# Patient Record
Sex: MALE | Race: WHITE | NOT HISPANIC OR LATINO | Employment: OTHER | URBAN - METROPOLITAN AREA
[De-identification: names, ages, dates, MRNs, and addresses within clinical notes are randomized per-mention and may not be internally consistent; named-entity substitution may affect disease eponyms.]

---

## 2019-10-14 ENCOUNTER — APPOINTMENT (EMERGENCY)
Dept: CT IMAGING | Facility: HOSPITAL | Age: 44
End: 2019-10-14
Payer: COMMERCIAL

## 2019-10-14 ENCOUNTER — HOSPITAL ENCOUNTER (EMERGENCY)
Facility: HOSPITAL | Age: 44
Discharge: HOME | End: 2019-10-14
Attending: EMERGENCY MEDICINE | Admitting: EMERGENCY MEDICINE
Payer: COMMERCIAL

## 2019-10-14 VITALS
OXYGEN SATURATION: 100 % | WEIGHT: 184.75 LBS | TEMPERATURE: 98.1 F | HEIGHT: 69 IN | RESPIRATION RATE: 16 BRPM | BODY MASS INDEX: 27.36 KG/M2 | HEART RATE: 76 BPM | DIASTOLIC BLOOD PRESSURE: 72 MMHG | SYSTOLIC BLOOD PRESSURE: 145 MMHG

## 2019-10-14 DIAGNOSIS — R11.0 NAUSEA: ICD-10-CM

## 2019-10-14 DIAGNOSIS — R51.9 HEADACHE: Primary | ICD-10-CM

## 2019-10-14 PROCEDURE — 99283 EMERGENCY DEPT VISIT LOW MDM: CPT

## 2019-10-14 PROCEDURE — 99284 EMERGENCY DEPT VISIT MOD MDM: CPT | Performed by: EMERGENCY MEDICINE

## 2019-10-14 RX ORDER — ONDANSETRON 4 MG/1
4 TABLET, ORALLY DISINTEGRATING ORAL ONCE
Status: COMPLETED | OUTPATIENT
Start: 2019-10-14 | End: 2019-10-14

## 2019-10-14 RX ORDER — METOCLOPRAMIDE 10 MG/1
10 TABLET ORAL 4 TIMES DAILY
Qty: 28 TABLET | Refills: 0 | Status: SHIPPED | OUTPATIENT
Start: 2019-10-14 | End: 2019-10-21

## 2019-10-14 RX ORDER — ACETAMINOPHEN 325 MG/1
975 TABLET ORAL ONCE
Status: COMPLETED | OUTPATIENT
Start: 2019-10-14 | End: 2019-10-14

## 2019-10-14 RX ADMIN — ACETAMINOPHEN 975 MG: 325 TABLET, FILM COATED ORAL at 01:54

## 2019-10-14 RX ADMIN — ONDANSETRON 4 MG: 4 TABLET, ORALLY DISINTEGRATING ORAL at 01:54

## 2019-10-14 NOTE — ED PROVIDER NOTES
History  Chief Complaint   Patient presents with    Headache     Pt presents to ED from home where pt had a stressful day, now having HA and head pressure  PT stated "I feel like blood is going to come out my ear "  Pt denies hx of migraines  Pt (-) N/V, (-) sensitivity to light or sound  Patient is a 45year old male with gradual diffuse headache since yesterday afternoon  Took ibuprofen with some relief  No trauma  (+) nausea  No vomiting  No fever  No SAH in family  States he did not drive here  (+) increased stress  No recent old records from this ED seen on computer system  [facility] website checked on this patient and no Rx found  No photophobia  States he feels like his ears are going to burst        History provided by:  Patient and spouse   used: No    Headache   Associated symptoms: nausea    Associated symptoms: no fever, no photophobia and no vomiting        None       History reviewed  No pertinent past medical history  History reviewed  No pertinent surgical history  History reviewed  No pertinent family history  I have reviewed and agree with the history as documented  Social History     Tobacco Use    Smoking status: Never Smoker    Smokeless tobacco: Never Used   Substance Use Topics    Alcohol use: Yes     Comment: occ    Drug use: Not Currently        Review of Systems   Constitutional: Negative for fever  Eyes: Negative for photophobia  Gastrointestinal: Positive for nausea  Negative for vomiting  Neurological: Positive for headaches  All other systems reviewed and are negative  Physical Exam  Physical Exam   Constitutional: He is oriented to person, place, and time  He appears well-developed and well-nourished  He appears distressed (mild)  HENT:   Head: Normocephalic and atraumatic  Right Ear: External ear normal    Left Ear: External ear normal    Mouth/Throat: Oropharynx is clear and moist  No oropharyngeal exudate     nontender scalp and sinuses  Eyes: Pupils are equal, round, and reactive to light  EOM are normal  No scleral icterus  Fundi: no hemorrhage  No photosensitivity  Neck: Normal range of motion  Neck supple  No tracheal deviation present  Cardiovascular: Normal rate, regular rhythm and normal heart sounds  No murmur heard  Pulmonary/Chest: Effort normal and breath sounds normal  No respiratory distress  Abdominal: Soft  Bowel sounds are normal  There is no tenderness  Musculoskeletal: He exhibits no edema or deformity  Neurological: He is alert and oriented to person, place, and time  Skin: Skin is warm and dry  No rash noted  Psychiatric:   Somewhat anxious  Nursing note and vitals reviewed  Vital Signs  ED Triage Vitals [10/14/19 0050]   Temperature Pulse Respirations Blood Pressure SpO2   98 1 °F (36 7 °C) 76 16 145/72 100 %      Temp Source Heart Rate Source Patient Position - Orthostatic VS BP Location FiO2 (%)   Oral Monitor Sitting Left arm --      Pain Score       8           Vitals:    10/14/19 0050   BP: 145/72   Pulse: 76   Patient Position - Orthostatic VS: Sitting         Visual Acuity      ED Medications  Medications   acetaminophen (TYLENOL) tablet 975 mg (975 mg Oral Given 10/14/19 0154)   ondansetron (ZOFRAN-ODT) dispersible tablet 4 mg (4 mg Oral Given 10/14/19 0154)       Diagnostic Studies  Results Reviewed     None                 CT head without contrast    (Results Pending)              Procedures  Procedures       ED Course  ED Course as of Oct 14 0202   Mon Oct 14, 2019   0200 Patient does not want CT head and states he feels better  CT tech notified to cancel CT head  MDM  Number of Diagnoses or Management Options  Diagnosis management comments: DDx including but not limited to: tension headache, cluster headache, migraine; doubt ICH, SAH, tumor, meningitis, temporal arteritis, carbon monoxide poisoning, zoster; sinusitis          Amount and/or Complexity of Data Reviewed  Tests in the radiology section of CPT®: ordered and reviewed  Decide to obtain previous medical records or to obtain history from someone other than the patient: yes        Disposition  Final diagnoses:   Headache   Nausea     Time reflects when diagnosis was documented in both MDM as applicable and the Disposition within this note     Time User Action Codes Description Comment    10/14/2019  1:42 AM Mukul Montana Add [R51] Headache     10/14/2019  1:42 AM [facility] Add [R11 0] Nausea       ED Disposition     ED Disposition Condition Date/Time Comment    Discharge Stable Mon Oct 14, 2019  2:01 AM Arnaldo Castro discharge to home/self care  Follow-up Information     Follow up With Specialties Details Why Contact Info    own primary doctor  Call in 1 day Return sooner if increased pain, worsening nausea, vomiting, rash, lethargy, weakness, numbness  tylenol for pain  Patient's Medications   Discharge Prescriptions    METOCLOPRAMIDE (REGLAN) 10 MG TABLET    Take 1 tablet (10 mg total) by mouth 4 (four) times a day for 7 days As needed for nausea       Start Date: 10/14/2019End Date: 10/21/2019       Order Dose: 10 mg       Quantity: 28 tablet    Refills: 0     No discharge procedures on file      ED Provider  Electronically Signed by           Dinah Ramsey MD  10/14/19 0745

## 2019-10-14 NOTE — ED NOTES
Patient states he does not want to stay for CT Head, just wants to go home  Dr Saad Taylor made aware       Ketan Hardy, RN  10/14/19 7601

## 2020-03-06 ENCOUNTER — OFFICE VISIT (OUTPATIENT)
Dept: INTERNAL MEDICINE CLINIC | Facility: CLINIC | Age: 45
End: 2020-03-06
Payer: COMMERCIAL

## 2020-03-06 VITALS
DIASTOLIC BLOOD PRESSURE: 76 MMHG | SYSTOLIC BLOOD PRESSURE: 122 MMHG | BODY MASS INDEX: 28.95 KG/M2 | RESPIRATION RATE: 15 BRPM | HEART RATE: 80 BPM | OXYGEN SATURATION: 98 % | WEIGHT: 191 LBS | HEIGHT: 68 IN

## 2020-03-06 DIAGNOSIS — Z11.4 SCREENING FOR HIV WITHOUT PRESENCE OF RISK FACTORS: ICD-10-CM

## 2020-03-06 DIAGNOSIS — G25.81 RESTLESS LEG: ICD-10-CM

## 2020-03-06 DIAGNOSIS — M25.561 CHRONIC PAIN OF BOTH KNEES: Primary | ICD-10-CM

## 2020-03-06 DIAGNOSIS — B07.0 PLANTAR WART OF BOTH FEET: ICD-10-CM

## 2020-03-06 DIAGNOSIS — N20.0 NEPHROLITHIASIS: ICD-10-CM

## 2020-03-06 DIAGNOSIS — M25.562 CHRONIC PAIN OF BOTH KNEES: Primary | ICD-10-CM

## 2020-03-06 DIAGNOSIS — Z12.5 SCREENING FOR PROSTATE CANCER: ICD-10-CM

## 2020-03-06 DIAGNOSIS — Z13.220 SCREENING FOR HYPERLIPIDEMIA: ICD-10-CM

## 2020-03-06 DIAGNOSIS — Z23 INFLUENZA VACCINE NEEDED: ICD-10-CM

## 2020-03-06 DIAGNOSIS — Z13.29 SCREENING FOR THYROID DISORDER: ICD-10-CM

## 2020-03-06 DIAGNOSIS — N50.812 TESTICULAR PAIN, LEFT: ICD-10-CM

## 2020-03-06 DIAGNOSIS — Z13.1 SCREENING FOR DIABETES MELLITUS: ICD-10-CM

## 2020-03-06 DIAGNOSIS — G89.29 CHRONIC PAIN OF BOTH KNEES: Primary | ICD-10-CM

## 2020-03-06 LAB
SL AMB  POCT GLUCOSE, UA: NEGATIVE
SL AMB LEUKOCYTE ESTERASE,UA: NEGATIVE
SL AMB POCT BILIRUBIN,UA: NEGATIVE
SL AMB POCT BLOOD,UA: NEGATIVE
SL AMB POCT CLARITY,UA: ABNORMAL
SL AMB POCT COLOR,UA: ABNORMAL
SL AMB POCT KETONES,UA: ABNORMAL
SL AMB POCT NITRITE,UA: NEGATIVE
SL AMB POCT PH,UA: 6
SL AMB POCT SPECIFIC GRAVITY,UA: 1.03
SL AMB POCT URINE PROTEIN: 15
SL AMB POCT UROBILINOGEN: 0.2

## 2020-03-06 PROCEDURE — 3008F BODY MASS INDEX DOCD: CPT | Performed by: INTERNAL MEDICINE

## 2020-03-06 PROCEDURE — 90471 IMMUNIZATION ADMIN: CPT

## 2020-03-06 PROCEDURE — 81002 URINALYSIS NONAUTO W/O SCOPE: CPT | Performed by: INTERNAL MEDICINE

## 2020-03-06 PROCEDURE — 99204 OFFICE O/P NEW MOD 45 MIN: CPT | Performed by: INTERNAL MEDICINE

## 2020-03-06 PROCEDURE — 1036F TOBACCO NON-USER: CPT | Performed by: INTERNAL MEDICINE

## 2020-03-06 PROCEDURE — 90686 IIV4 VACC NO PRSV 0.5 ML IM: CPT

## 2020-03-06 RX ORDER — AMOXICILLIN AND CLAVULANATE POTASSIUM 500; 125 MG/1; MG/1
TABLET, FILM COATED ORAL
COMMUNITY
Start: 2019-12-26 | End: 2020-03-06

## 2020-03-06 RX ORDER — IBUPROFEN 600 MG/1
TABLET ORAL
COMMUNITY
Start: 2019-12-26 | End: 2020-03-06

## 2020-03-06 RX ORDER — MELOXICAM 15 MG/1
15 TABLET ORAL DAILY
COMMUNITY
Start: 2018-06-04 | End: 2020-03-06

## 2020-03-06 RX ORDER — MELOXICAM 15 MG/1
15 TABLET ORAL DAILY
Qty: 30 TABLET | Refills: 1 | Status: SHIPPED | OUTPATIENT
Start: 2020-03-06 | End: 2020-09-10 | Stop reason: SDUPTHER

## 2020-03-06 RX ORDER — ESOMEPRAZOLE MAGNESIUM 40 MG/1
40 CAPSULE, DELAYED RELEASE ORAL
COMMUNITY
Start: 2018-06-04 | End: 2020-03-06

## 2020-03-06 RX ORDER — PRAMIPEXOLE DIHYDROCHLORIDE 0.12 MG/1
0.12 TABLET ORAL
Qty: 30 TABLET | Refills: 1 | Status: SHIPPED | OUTPATIENT
Start: 2020-03-06 | End: 2020-09-10 | Stop reason: SDUPTHER

## 2020-03-06 RX ORDER — CHLORHEXIDINE GLUCONATE 0.12 MG/ML
RINSE ORAL
COMMUNITY
Start: 2019-12-26 | End: 2020-03-06
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
## 2020-03-10 ENCOUNTER — APPOINTMENT (OUTPATIENT)
Dept: RADIOLOGY | Age: 45
End: 2020-03-10
Payer: COMMERCIAL

## 2020-03-10 DIAGNOSIS — M25.561 CHRONIC PAIN OF BOTH KNEES: ICD-10-CM

## 2020-03-10 DIAGNOSIS — G89.29 CHRONIC PAIN OF BOTH KNEES: ICD-10-CM

## 2020-03-10 DIAGNOSIS — M25.562 CHRONIC PAIN OF BOTH KNEES: ICD-10-CM

## 2020-03-10 PROCEDURE — 73562 X-RAY EXAM OF KNEE 3: CPT

## 2020-03-12 ENCOUNTER — HOSPITAL ENCOUNTER (OUTPATIENT)
Dept: RADIOLOGY | Age: 45
Discharge: HOME | End: 2020-03-12
Payer: COMMERCIAL

## 2020-03-12 DIAGNOSIS — N50.812 TESTICULAR PAIN, LEFT: ICD-10-CM

## 2020-03-12 DIAGNOSIS — N20.0 NEPHROLITHIASIS: ICD-10-CM

## 2020-03-12 PROCEDURE — 76870 US EXAM SCROTUM: CPT

## 2020-03-12 PROCEDURE — 76770 US EXAM ABDO BACK WALL COMP: CPT

## 2020-09-10 ENCOUNTER — OFFICE VISIT (OUTPATIENT)
Dept: INTERNAL MEDICINE CLINIC | Facility: CLINIC | Age: 45
End: 2020-09-10
Payer: COMMERCIAL

## 2020-09-10 VITALS
BODY MASS INDEX: 30.16 KG/M2 | HEART RATE: 70 BPM | OXYGEN SATURATION: 99 % | HEIGHT: 68 IN | RESPIRATION RATE: 17 BRPM | DIASTOLIC BLOOD PRESSURE: 80 MMHG | SYSTOLIC BLOOD PRESSURE: 122 MMHG | WEIGHT: 199 LBS

## 2020-09-10 DIAGNOSIS — G25.81 RESTLESS LEG: ICD-10-CM

## 2020-09-10 DIAGNOSIS — M25.562 CHRONIC PAIN OF BOTH KNEES: ICD-10-CM

## 2020-09-10 DIAGNOSIS — M25.561 CHRONIC PAIN OF BOTH KNEES: ICD-10-CM

## 2020-09-10 DIAGNOSIS — N50.812 TESTICULAR PAIN, LEFT: Primary | ICD-10-CM

## 2020-09-10 DIAGNOSIS — G89.29 CHRONIC PAIN OF BOTH KNEES: ICD-10-CM

## 2020-09-10 PROCEDURE — 3725F SCREEN DEPRESSION PERFORMED: CPT | Performed by: INTERNAL MEDICINE

## 2020-09-10 PROCEDURE — 1036F TOBACCO NON-USER: CPT | Performed by: INTERNAL MEDICINE

## 2020-09-10 PROCEDURE — 99396 PREV VISIT EST AGE 40-64: CPT | Performed by: INTERNAL MEDICINE

## 2020-09-10 RX ORDER — PRAMIPEXOLE DIHYDROCHLORIDE 0.12 MG/1
0.12 TABLET ORAL
Qty: 90 TABLET | Refills: 3 | Status: SHIPPED | OUTPATIENT
Start: 2020-09-10 | End: 2021-05-03

## 2020-09-10 RX ORDER — MELOXICAM 15 MG/1
15 TABLET ORAL DAILY PRN
Qty: 90 TABLET | Refills: 1 | Status: SHIPPED | OUTPATIENT
Start: 2020-09-10 | End: 2021-05-03

## 2020-09-10 NOTE — PROGRESS NOTES
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    #Testicular Pain  -continues to persist  -US testicle unremarkable  -refer to urology    #Restless Legs  -noted in lower legs  -remains on pramipexole with relief    #Kidney Stone  -chronic and occasional flares over left flank  -US kidney unremarkable  -to consider CT renal if symptoms persist, patient defers for now  -self treated with beer, cranberry juice    #GERD  -previously on PPI    #Health Maintenance  -routine labs and followup 1 year  -encouraged flu vaccine fall 2020    Addendum 11/19/20 patient seen by urology and diagnosed with orchalgia, will trial gabapentin and NSAID and consider trial spermatic cord block      No problem-specific Assessment & Plan notes found for this encounter  Diagnoses and all orders for this visit:    Testicular pain, left  -     Ambulatory referral to Urology; Future    Chronic pain of both knees  -     meloxicam (MOBIC) 15 mg tablet; Take 1 tablet (15 mg total) by mouth daily as needed for moderate pain    Restless leg  -     pramipexole (MIRAPEX) 0 125 mg tablet; Take 1 tablet (0 125 mg total) by mouth daily at bedtime    Other orders  -     Cancel: influenza vaccine, quadrivalent, 0 5 mL, preservative-free, for adult and pediatric patients 6 mos+ (AFLURIA, FLUARIX, FLULAVAL, FLUZONE)            Current Outpatient Medications:     meloxicam (MOBIC) 15 mg tablet, Take 1 tablet (15 mg total) by mouth daily as needed for moderate pain, Disp: 90 tablet, Rfl: 1    pramipexole (MIRAPEX) 0 125 mg tablet, Take 1 tablet (0 125 mg total) by mouth daily at bedtime, Disp: 90 tablet, Rfl: 3    Subjective:      Patient ID: Candi Ames is a 39 y o  male  HPI     Patient presents for routine visit  Denies any recent hospitalizations or surgeries  States that he continues to occasional testicular pain    We performed an ultrasound his testicles however did not find any abnormalities  We will refer him to urology at this time for further evaluation and treatment  Patient continues to have knee pain  He states that he has been denied within the last ago  Mother is aches resumed his knees were unremarkable  He also has restless legs that have been improved pramipexole and we will refill  Patient previously had a kidney stone  He states that he may have a repeat episode of this  He states that he has been drinking here as well as cranberry juice and within 2-3 days his stone symptoms have resolved  He has deferred CT scan this time  Patient will receive his flu vaccine next month  Will return to care in 1 year with labs  We are awaiting his routine labs when encouraged him to obtain this  The following portions of the patient's history were reviewed and updated as appropriate: allergies, current medications, past family history, past medical history, past social history, past surgical history and problem list     Review of Systems   Constitutional: Negative for activity change, appetite change, fatigue and fever  HENT: Negative for congestion, ear pain, hearing loss, sore throat and tinnitus  Eyes: Negative for photophobia, pain and visual disturbance  Respiratory: Negative for cough, shortness of breath and wheezing  Cardiovascular: Negative for chest pain and leg swelling  Gastrointestinal: Negative for abdominal distention, abdominal pain, nausea and vomiting  Genitourinary: Positive for testicular pain  Negative for difficulty urinating, frequency and hematuria  Musculoskeletal: Positive for arthralgias (knees)  Negative for back pain, joint swelling, neck pain and neck stiffness  Skin: Negative for color change, pallor, rash and wound  Neurological: Negative for dizziness, tremors, numbness and headaches  Hematological: Does not bruise/bleed easily           Objective:      BP 122/80   Pulse 70   Resp 17   Ht 5' 8" (1 727 m)   Wt 90 3 kg (199 lb)   SpO2 99%   BMI 30 26 kg/m²          Physical Exam  Vitals signs reviewed  Constitutional:       Appearance: He is well-developed  HENT:      Head: Normocephalic and atraumatic  Right Ear: External ear normal       Left Ear: External ear normal       Nose: Nose normal    Eyes:      Conjunctiva/sclera: Conjunctivae normal       Pupils: Pupils are equal, round, and reactive to light  Neck:      Musculoskeletal: Normal range of motion and neck supple  Thyroid: No thyromegaly  Vascular: No JVD  Cardiovascular:      Rate and Rhythm: Normal rate and regular rhythm  Heart sounds: Normal heart sounds  No murmur  Pulmonary:      Effort: Pulmonary effort is normal  No respiratory distress  Breath sounds: Normal breath sounds  No wheezing  Abdominal:      General: Bowel sounds are normal  There is no distension  Palpations: Abdomen is soft  Tenderness: There is no abdominal tenderness  There is no guarding or rebound  Musculoskeletal: Normal range of motion  General: Tenderness (anterior knees) present  No deformity  Right lower leg: No edema  Left lower leg: No edema  Lymphadenopathy:      Cervical: No cervical adenopathy  Skin:     General: Skin is warm and dry  Findings: No erythema or rash  Neurological:      Mental Status: He is alert and oriented to person, place, and time  Deep Tendon Reflexes: Reflexes are normal and symmetric

## 2020-10-20 ENCOUNTER — TELEPHONE (OUTPATIENT)
Dept: UROLOGY | Facility: CLINIC | Age: 45
End: 2020-10-20

## 2020-11-13 ENCOUNTER — OFFICE VISIT (OUTPATIENT)
Dept: UROLOGY | Facility: CLINIC | Age: 45
End: 2020-11-13
Payer: COMMERCIAL

## 2020-11-13 VITALS
WEIGHT: 185 LBS | DIASTOLIC BLOOD PRESSURE: 86 MMHG | SYSTOLIC BLOOD PRESSURE: 128 MMHG | TEMPERATURE: 97.3 F | HEART RATE: 70 BPM | BODY MASS INDEX: 28.13 KG/M2

## 2020-11-13 DIAGNOSIS — N50.812 PAIN IN LEFT TESTICLE: Primary | ICD-10-CM

## 2020-11-13 PROCEDURE — 1036F TOBACCO NON-USER: CPT | Performed by: PHYSICIAN ASSISTANT

## 2020-11-13 PROCEDURE — 99244 OFF/OP CNSLTJ NEW/EST MOD 40: CPT | Performed by: PHYSICIAN ASSISTANT

## 2020-11-13 RX ORDER — GABAPENTIN 100 MG/1
100 CAPSULE ORAL
Qty: 60 CAPSULE | Refills: 0 | Status: SHIPPED | OUTPATIENT
Start: 2020-11-13 | End: 2020-11-13

## 2020-11-13 RX ORDER — GABAPENTIN 300 MG/1
300 CAPSULE ORAL
Qty: 60 CAPSULE | Refills: 0 | Status: SHIPPED | OUTPATIENT
Start: 2020-11-13 | End: 2021-05-03

## 2021-01-11 ENCOUNTER — EVALUATION (OUTPATIENT)
Dept: PHYSICAL THERAPY | Facility: REHABILITATION | Age: 46
End: 2021-01-11
Payer: COMMERCIAL

## 2021-01-11 DIAGNOSIS — N50.812 PAIN IN LEFT TESTICLE: Primary | ICD-10-CM

## 2021-01-11 PROCEDURE — 97162 PT EVAL MOD COMPLEX 30 MIN: CPT | Performed by: PHYSICAL THERAPIST

## 2021-01-11 PROCEDURE — 97110 THERAPEUTIC EXERCISES: CPT | Performed by: PHYSICAL THERAPIST
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
## 2021-01-19 ENCOUNTER — OFFICE VISIT (OUTPATIENT)
Dept: UROLOGY | Facility: CLINIC | Age: 46
End: 2021-01-19
Payer: COMMERCIAL

## 2021-01-19 ENCOUNTER — TELEPHONE (OUTPATIENT)
Dept: UROLOGY | Facility: CLINIC | Age: 46
End: 2021-01-19

## 2021-01-19 VITALS
BODY MASS INDEX: 28.04 KG/M2 | HEIGHT: 68 IN | SYSTOLIC BLOOD PRESSURE: 120 MMHG | DIASTOLIC BLOOD PRESSURE: 64 MMHG | HEART RATE: 68 BPM | WEIGHT: 185 LBS

## 2021-01-19 DIAGNOSIS — N50.812 PAIN IN LEFT TESTICLE: Primary | ICD-10-CM

## 2021-01-19 PROCEDURE — 99213 OFFICE O/P EST LOW 20 MIN: CPT | Performed by: NURSE PRACTITIONER

## 2021-01-19 PROCEDURE — 3008F BODY MASS INDEX DOCD: CPT | Performed by: NURSE PRACTITIONER

## 2021-01-19 PROCEDURE — 1036F TOBACCO NON-USER: CPT | Performed by: NURSE PRACTITIONER

## 2021-01-19 NOTE — TELEPHONE ENCOUNTER
Patient seen today by Shira Hudson for orchalgia  She would like patient to follow up with Dr Kala Colindres at [facility] fpr ongoing orchalgia management  Please advise

## 2021-01-19 NOTE — TELEPHONE ENCOUNTER
I spoke to pt and he asked to be rescheduled to May   He stated he is doing therapy and he would like to see if that helps first

## 2021-01-19 NOTE — TELEPHONE ENCOUNTER
Patient scheduled for 2/22/21 at 815am with Dr Rico Tolentino in the [facility] office  Please confirm

## 2021-02-17 ENCOUNTER — OFFICE VISIT (OUTPATIENT)
Dept: PHYSICAL THERAPY | Facility: REHABILITATION | Age: 46
End: 2021-02-17
Payer: COMMERCIAL

## 2021-02-17 DIAGNOSIS — N50.812 PAIN IN LEFT TESTICLE: Primary | ICD-10-CM

## 2021-02-17 PROCEDURE — 97110 THERAPEUTIC EXERCISES: CPT | Performed by: PHYSICAL THERAPIST

## 2021-02-17 PROCEDURE — 97140 MANUAL THERAPY 1/> REGIONS: CPT | Performed by: PHYSICAL THERAPIST

## 2021-02-17 NOTE — PROGRESS NOTES
Daily Note     Today's date: 2021  Patient name: He Orozco  : 1975  MRN: [id number]  Referring provider: Jocelyne Campos  Dx:   Encounter Diagnosis     ICD-10-CM    1  Pain in left testicle  N50 812                   Subjective: Pt has been feeling the same since IE  Pain is only on the left  Pt denies saddle anesthesia  Pt reports no increased pain after IE  Objective: See treatment diary below      Assessment: Tolerated treatment well  Patient demonstrated fatigue post treatment, exhibited good technique with therapeutic exercises and would benefit from continued PT to decrease pain in L testicle  Pt had good tolerance to stretches with L LE noticeably tighter than the R  Pt has significant soft tissue restrictions in L adductor, mild to moderate on R side  Pt has trigger points without pain throughout L abdominals  Applied cupping with good tolerance from pt but only mild change in tissue restriction  Pt was given updated HEP and verbalized understanding  Plan: Continue per plan of care  Progress treatment as tolerated         Precautions: GERD      Manuals            Adductor STM nv ED           Lower abdominal skin rolling nv ED           L abdominals cupping  ED                        Neuro Re-Ed            Diaphragmatic breathing nv nv                                                                                         Ther Ex            Piriformis stretch 2x30" ea 3x30" ea           Adductor LLLD stretch nv 3 min           Open book stretch nv 10x ea           LTR nv 3"x10 ea           Hip flexor stretch EOT nv 30"x3 ea           APT/PPT nv 10x                                     Ther Activity                                       Gait Training                                       Modalities

## 2021-02-18 ENCOUNTER — TELEPHONE (OUTPATIENT)
Dept: INTERNAL MEDICINE CLINIC | Facility: CLINIC | Age: 46
End: 2021-02-18

## 2021-02-18 NOTE — TELEPHONE ENCOUNTER
Patient reports that he has been experiencing a sore throat with globus sensation for the past month  States that on occasion it is difficult to drink and swallow food  Denies nasal congestion, fever, cough, sinus pressure  Reports he does not feel any lumps around neck area  DENies issues breathing  Recommended that he come into the office for an evaluation 2/19/21 at 10:30am due to need for physical exam     Also reports he has acid reflux and dark stool for past week  Reports he ate spicy food  Denies FRITZ, fatigue, chest pain  Recommended he avoid acidic foods  States he has been taking a protein meal replacement supplement and he will bring it into the office tomorrow  Recommended famotidine for now

## 2021-02-19 ENCOUNTER — OFFICE VISIT (OUTPATIENT)
Dept: INTERNAL MEDICINE CLINIC | Facility: CLINIC | Age: 46
End: 2021-02-19
Payer: COMMERCIAL

## 2021-02-19 VITALS
WEIGHT: 195 LBS | DIASTOLIC BLOOD PRESSURE: 60 MMHG | SYSTOLIC BLOOD PRESSURE: 124 MMHG | BODY MASS INDEX: 29.65 KG/M2 | HEART RATE: 80 BPM

## 2021-02-19 DIAGNOSIS — K92.1 BLOODY STOOL: ICD-10-CM

## 2021-02-19 DIAGNOSIS — Z12.5 SCREENING FOR PROSTATE CANCER: ICD-10-CM

## 2021-02-19 DIAGNOSIS — Z13.29 SCREENING FOR THYROID DISORDER: ICD-10-CM

## 2021-02-19 DIAGNOSIS — R74.01 TRANSAMINITIS: ICD-10-CM

## 2021-02-19 DIAGNOSIS — Z13.220 SCREENING FOR HYPERLIPIDEMIA: ICD-10-CM

## 2021-02-19 DIAGNOSIS — K21.9 GASTROESOPHAGEAL REFLUX DISEASE, UNSPECIFIED WHETHER ESOPHAGITIS PRESENT: Primary | ICD-10-CM

## 2021-02-19 DIAGNOSIS — Z13.1 SCREENING FOR DIABETES MELLITUS: ICD-10-CM

## 2021-02-19 PROCEDURE — 99213 OFFICE O/P EST LOW 20 MIN: CPT | Performed by: INTERNAL MEDICINE

## 2021-02-19 PROCEDURE — 1036F TOBACCO NON-USER: CPT | Performed by: INTERNAL MEDICINE

## 2021-02-19 RX ORDER — FAMOTIDINE 40 MG/1
40 TABLET, FILM COATED ORAL 2 TIMES DAILY PRN
Qty: 60 TABLET | Refills: 0 | Status: SHIPPED | OUTPATIENT
Start: 2021-02-19 | End: 2021-09-17

## 2021-02-19 NOTE — PROGRESS NOTES
Assessment/Plan:    #GERD  -episode present for past month with burning sensation in throat area with difficulty eating and swallowing on occasion  -previous 2018 fluoroscopy upper GI with air and KUB negative except for hiatal hernia  -went over low acidic diet  -start pepcid  -refer to GI    #Dysphagia  -reports difficulty with eating and drinking with sensation of food getting stuck on ccasion    #Bloody Stools  -reports eating spicy food  -has intermittent spotty bleeding  -denies abdominal pain  -will refer to GI    BMI Counseling: Body mass index is 29 65 kg/m²  Discussed the patient's BMI with him  The BMI is above normal  Nutrition recommendations include 3-5 servings of fruits/vegetables daily, moderation in carbohydrate intake and reducing intake of saturated fat and trans fat  Exercise recommendations include vigorous aerobic physical activity for 75 minutes/week and exercising 3-5 times per week  Addendum 3/3/21 reports pepcid is working  Addendum 4/9/21 EGD unremarkable    No problem-specific Assessment & Plan notes found for this encounter  Diagnoses and all orders for this visit:    Gastroesophageal reflux disease, unspecified whether esophagitis present  -     famotidine (PEPCID) 40 MG tablet; Take 1 tablet (40 mg total) by mouth 2 (two) times a day as needed for indigestion  -     Ambulatory referral to Gastroenterology; Future  -     CBC and differential  -     Comprehensive metabolic panel    Bloody stool  -     famotidine (PEPCID) 40 MG tablet; Take 1 tablet (40 mg total) by mouth 2 (two) times a day as needed for indigestion  -     Ambulatory referral to Gastroenterology;  Future  -     CBC and differential  -     Comprehensive metabolic panel    Screening for diabetes mellitus  -     CBC and differential  -     Hemoglobin A1C  -     Comprehensive metabolic panel    Screening for hyperlipidemia  -     CBC and differential  -     Comprehensive metabolic panel  -     Lipid Panel With Direct LDL    Screening for thyroid disorder  -     CBC and differential  -     TSH, 3rd generation with Free T4 reflex  -     Comprehensive metabolic panel    Screening for prostate cancer  -     CBC and differential  -     Comprehensive metabolic panel  -     PSA, total and free            Current Outpatient Medications:     famotidine (PEPCID) 40 MG tablet, Take 1 tablet (40 mg total) by mouth 2 (two) times a day as needed for indigestion, Disp: 60 tablet, Rfl: 0    gabapentin (NEURONTIN) 300 mg capsule, Take 1 capsule (300 mg total) by mouth daily at bedtime (Patient not taking: Reported on 1/19/2021), Disp: 60 capsule, Rfl: 0    meloxicam (MOBIC) 15 mg tablet, Take 1 tablet (15 mg total) by mouth daily as needed for moderate pain (Patient not taking: Reported on 11/13/2020), Disp: 90 tablet, Rfl: 1    pramipexole (MIRAPEX) 0 125 mg tablet, Take 1 tablet (0 125 mg total) by mouth daily at bedtime (Patient not taking: Reported on 11/13/2020), Disp: 90 tablet, Rfl: 3    Subjective:      Patient ID: Arnaldo Castro is a 39 y o  male  HPI     Patient presents for an acute visit  Reports that he has been experiencing a sore throat with feeling as if food is getting stuck in his throat area  He denies any nasal congestion or any fevers or cough or any sinus pressure  There are no lumps palpable on examination  Thyroid felt normal size  Did not have any issues breathing  He reports that symptoms have fell on and off since middle December  He does have acid reflux with a burning sensation  He has had been having blood in his stool for the past week  He states that he ate a significant amount of spicy food however he has discontinued this  We discussed a low acidic diet with him today  In 2018 patient underwent fluoroscopy with an upper GI series which revealed a hiatal hernia but no other signs of any masses    At this time we recommended the patient start on Pepcid to see if this will provide him with any relief  We will refer him to GI for further evaluation  The following portions of the patient's history were reviewed and updated as appropriate: allergies, current medications, past family history, past medical history, past social history, past surgical history and problem list     Review of Systems   Constitutional: Negative for activity change, appetite change, fatigue and fever  HENT: Positive for trouble swallowing  Negative for congestion, ear pain, hearing loss, sore throat and tinnitus  Eyes: Negative for photophobia, pain and visual disturbance  Respiratory: Negative for cough, shortness of breath and wheezing  Cardiovascular: Negative for chest pain and leg swelling  Gastrointestinal: Positive for abdominal pain, blood in stool and diarrhea  Negative for abdominal distention, constipation, nausea and vomiting  Genitourinary: Negative for difficulty urinating, frequency and hematuria  Musculoskeletal: Negative for arthralgias, back pain, joint swelling, neck pain and neck stiffness  Skin: Negative for color change, pallor, rash and wound  Neurological: Negative for dizziness, tremors, numbness and headaches  Hematological: Does not bruise/bleed easily  Objective:      /60   Pulse 80   Wt 88 5 kg (195 lb)   BMI 29 65 kg/m²          Physical Exam  Vitals signs reviewed  Constitutional:       Appearance: Normal appearance  He is well-developed  HENT:      Head: Normocephalic and atraumatic  Nose: Nose normal  No congestion or rhinorrhea  Mouth/Throat:      Mouth: Mucous membranes are dry  Pharynx: Oropharynx is clear  No oropharyngeal exudate or posterior oropharyngeal erythema  Eyes:      Conjunctiva/sclera: Conjunctivae normal       Pupils: Pupils are equal, round, and reactive to light  Neck:      Musculoskeletal: Normal range of motion and neck supple  Thyroid: No thyromegaly  Vascular: No JVD     Cardiovascular:      Rate and Rhythm: Normal rate and regular rhythm  Pulses: Normal pulses  Heart sounds: Normal heart sounds  No murmur  Pulmonary:      Effort: Pulmonary effort is normal  No respiratory distress  Breath sounds: Normal breath sounds  No stridor  No wheezing, rhonchi or rales  Abdominal:      General: Bowel sounds are normal  There is no distension  Palpations: Abdomen is soft  There is no mass  Tenderness: There is no abdominal tenderness  There is no right CVA tenderness, left CVA tenderness, guarding or rebound  Musculoskeletal: Normal range of motion  General: No tenderness or deformity  Right lower leg: No edema  Left lower leg: No edema  Lymphadenopathy:      Cervical: No cervical adenopathy  Skin:     General: Skin is warm and dry  Findings: No erythema or rash  Neurological:      Mental Status: He is alert and oriented to person, place, and time  Deep Tendon Reflexes: Reflexes are normal and symmetric  This note was completed in part utilizing [employer] direct voice recognition software  Grammatical errors, random word insertion, spelling mistakes, and incomplete sentences may be an occasional consequence of the system secondary to software limitations, ambient noise and hardware issues  At the time of dictation, efforts were made to edit, clarify and /or correct errors  Please read the chart carefully and recognize, using context, where substitutions have occurred  If you have any questions or concerns about the context, text or information contained within the body of this dictation, please contact myself, the provider, for further clarification

## 2021-02-26 LAB — HBA1C MFR BLD HPLC: 5.3 %

## 2021-03-03 ENCOUNTER — OFFICE VISIT (OUTPATIENT)
Dept: PHYSICAL THERAPY | Facility: REHABILITATION | Age: 46
End: 2021-03-03
Payer: COMMERCIAL

## 2021-03-03 DIAGNOSIS — N50.812 PAIN IN LEFT TESTICLE: Primary | ICD-10-CM

## 2021-03-03 PROCEDURE — 97110 THERAPEUTIC EXERCISES: CPT | Performed by: PHYSICAL THERAPIST

## 2021-03-03 PROCEDURE — 97140 MANUAL THERAPY 1/> REGIONS: CPT | Performed by: PHYSICAL THERAPIST

## 2021-03-03 NOTE — PROGRESS NOTES
Daily Note     Today's date: 3/3/2021  Patient name: Joann Vallecillo  : 1975  MRN: [id number]  Referring provider: Ely Grigsby  Dx:   Encounter Diagnosis     ICD-10-CM    1  Pain in left testicle  N50 812                   Subjective: Pt has been feeling good  "I didn't do as much exercise as I should " Pt states that testicular pain is about the same as before  Pt continues to have pain worse with arousal and after 2-3 days of abstinence  Pt will start on exercise bike on Monday  Objective: See treatment diary below      Assessment: Tolerated treatment well  Patient demonstrated fatigue post treatment, exhibited good technique with therapeutic exercises and would benefit from continued PT to decrease testicular pain  Pt continues to have less flexibility on L compared to R  Pt has significant soft tissue restrictions in L abdomen, almost feeling like a rope or cord  Pt has mild improvement with cupping to L abdomen  Pt has good tolerance to stretches and will continue these at home  Added back extension exercises to stretch abdomen without increase in pain  Plan: Continue per plan of care  Progress treatment as tolerated         Precautions: GERD      Manuals  3/3          Adductor STM nv ED L, ED          Lower abdominal skin rolling nv ED ED          L abdominals cupping  ED ED                       Neuro Re-Ed  3/3          Diaphragmatic breathing nv nv nv                                                                                        Ther Ex  3/3          Piriformis stretch 2x30" ea 3x30" ea 30"x3 ea          Adductor LLLD stretch nv 3 min 3 min          Open book stretch nv 10x ea 10x ea          LTR nv 3"x10 ea 5"x10 ea          Hip flexor stretch EOT nv 30"x3 ea 30"x3 ea          APT/PPT nv 10x nv          Recumbent bike   5' L3          pball 3 way flexion stretch   30"x3 ea          bridge   3"x15          Prone on elbows   5"x5          Ther Activity                                       Gait Training                                       Modalities

## 2021-03-04 ENCOUNTER — TELEPHONE (OUTPATIENT)
Dept: INTERNAL MEDICINE CLINIC | Facility: CLINIC | Age: 46
End: 2021-03-04

## 2021-03-04 NOTE — TELEPHONE ENCOUNTER
----- Message from Wendy Flor DO sent at 3/3/2021  5:10 PM EST -----  Patient was informed of elevated liver enzymes, please arrange to have him complete US liver, and obtain labs (hepatitis panel, CMP) on Friday

## 2021-03-10 ENCOUNTER — OFFICE VISIT (OUTPATIENT)
Dept: PHYSICAL THERAPY | Facility: REHABILITATION | Age: 46
End: 2021-03-10
Payer: COMMERCIAL

## 2021-03-10 DIAGNOSIS — N50.812 PAIN IN LEFT TESTICLE: Primary | ICD-10-CM

## 2021-03-10 PROCEDURE — 97110 THERAPEUTIC EXERCISES: CPT | Performed by: PHYSICAL THERAPIST

## 2021-03-10 PROCEDURE — 97140 MANUAL THERAPY 1/> REGIONS: CPT | Performed by: PHYSICAL THERAPIST

## 2021-03-10 NOTE — PROGRESS NOTES
Daily Note     Today's date: 3/10/2021  Patient name: Lida Lopez  : 1975  MRN: [id number]  Referring provider: Eliza Daley  Dx:   Encounter Diagnosis     ICD-10-CM    1  Pain in left testicle  N50 812                   Subjective: Pt has been feeling sore with initiating bike at home  Pt has been able to get further into his stretches  Pt continues to have L testicular pain, unchanged since LV  Pt responded well to cupping at LV  Objective: See treatment diary below      Assessment: Tolerated treatment well  Patient demonstrated fatigue post treatment, exhibited good technique with therapeutic exercises and would benefit from continued PT to decrease testicular pain  Pt continues to have less flexibility on L compared to R  Pt has improved tolerance to cupping to lower L abdominals with decreased restriction noted with palpation afterwards  Pt was challenged with addition of Renetta wall slides  Pt reports fatigue and soreness at end of session but no pain  Plan: Continue per plan of care  Progress treatment as tolerated         Precautions: GERD      Manuals 1/11 2/17 3/3 3/10         Adductor STM nv ED L, ED nv         Lower abdominal skin rolling nv ED ED ED         L abdominals cupping  ED ED ED                      Neuro Re-Ed 1/11 2/17 3/3 3/10         Diaphragmatic breathing nv nv nv nv                                                                                       Ther Ex 1/11 2/17 3/3 3/10         Piriformis stretch 2x30" ea 3x30" ea 30"x3 ea 30"x3 ea         Adductor LLLD stretch nv 3 min 3 min 3 min         Open book stretch nv 10x ea 10x ea nv         LTR nv 3"x10 ea 5"x10 ea 5"x10 ea         Hip flexor stretch EOT nv 30"x3 ea 30"x3 ea 30"x3 ea         APT/PPT nv 10x nv 25x ea, + LPT         Recumbent bike   5' L3 5' L1         pball 3 way flexion stretch   30"x3 ea 30"x3 ea         bridge   3"x15 3"x20         Prone on elbows   5"x5 5"x10         Prone superman 5"x10         Renetta wall slides    L 10x3"         Ther Activity                                       Gait Training                                       Modalities

## 2021-03-12 ENCOUNTER — HOSPITAL ENCOUNTER (OUTPATIENT)
Dept: ULTRASOUND IMAGING | Facility: MEDICAL CENTER | Age: 46
Discharge: HOME | End: 2021-03-12

## 2021-03-12 DIAGNOSIS — R74.01 TRANSAMINITIS: ICD-10-CM

## 2021-03-17 ENCOUNTER — HOSPITAL ENCOUNTER (OUTPATIENT)
Dept: ULTRASOUND IMAGING | Facility: MEDICAL CENTER | Age: 46
Discharge: HOME | End: 2021-03-17
Payer: COMMERCIAL

## 2021-03-17 ENCOUNTER — OFFICE VISIT (OUTPATIENT)
Dept: PHYSICAL THERAPY | Facility: REHABILITATION | Age: 46
End: 2021-03-17
Payer: COMMERCIAL

## 2021-03-17 DIAGNOSIS — N50.812 PAIN IN LEFT TESTICLE: Primary | ICD-10-CM

## 2021-03-17 PROCEDURE — 97140 MANUAL THERAPY 1/> REGIONS: CPT | Performed by: PHYSICAL THERAPIST

## 2021-03-17 PROCEDURE — 97110 THERAPEUTIC EXERCISES: CPT | Performed by: PHYSICAL THERAPIST

## 2021-03-17 PROCEDURE — 76705 ECHO EXAM OF ABDOMEN: CPT

## 2021-03-22 ENCOUNTER — TELEPHONE (OUTPATIENT)
Dept: INTERNAL MEDICINE CLINIC | Facility: CLINIC | Age: 46
End: 2021-03-22

## 2021-03-22 NOTE — TELEPHONE ENCOUNTER
----- Message from Luise Mcardle, DO sent at 3/18/2021 12:49 PM EDT -----  Please let patient know his US of his liver was normal  He should obtain repeat lab work for the hepatitis panel and liver enzymes to see if his elevated liver enzymes previously are back to normal

## 2021-03-22 NOTE — TELEPHONE ENCOUNTER
Called patient and gave him the message  I will mail out blood work per his request so he can get it done

## 2021-03-24 ENCOUNTER — EVALUATION (OUTPATIENT)
Dept: PHYSICAL THERAPY | Facility: REHABILITATION | Age: 46
End: 2021-03-24
Payer: COMMERCIAL

## 2021-03-24 DIAGNOSIS — N50.812 PAIN IN LEFT TESTICLE: Primary | ICD-10-CM

## 2021-03-24 PROCEDURE — 97164 PT RE-EVAL EST PLAN CARE: CPT | Performed by: PHYSICAL THERAPIST

## 2021-03-24 NOTE — PROGRESS NOTES
PT Re-Evaluation  and PT Discharge    Today's date: 3/24/2021  Patient name: Rosa Maria Beodya  : 1975  MRN: [id number]  Referring provider: Maria Frederick  Dx:   Encounter Diagnosis     ICD-10-CM    1  Pain in left testicle  N50 812                   Assessment  Assessment details: Rosa Maria Bedoya is a 39 y o  male who presents to physical therapy with Pain in left testicle  Pt was offered option of second person in the room prior to physical examination  Pt was explained purpose and process of assessment and provided consent prior to initiation of physical exam  Pt did not have any reproduction of pain with palpation to pelvic floor or with pelvic floor contraction or stretch  Pt continues to have areas of ropy feeling with palpation over bilateral abdomen, L > R  Pt has improved relaxation of pelvic floor holds  Pt has improved hip flexibility and pelvic floor endurance  Pt has reached maximal benefit of skilled physical therapy  Advised pt to keep appointment with urology consult secondary to therapist unable to mechanically reproduce pt's pain  Pt is agreeable with this plan and will be discharged to comprehensive HEP at this time  Thank you for this referral     Impairments: abnormal muscle tone, activity intolerance and pain with function  Understanding of Dx/Px/POC: good   Prognosis: good    Goals  Short term:  1  Pt will improve spinal alignment to place diaphragm parallel to pelvic floor for greater ROM in 4 weeks  - met  2  Pt will be compliant with HEP in 4 weeks  - met    Long term:  1  Pt will report pain <2/10 prior to erection to allow for blood flow to nourish tissues by discharge  - not met  2   Pt will report no pain with sitting on any surface by discharge -met    Plan  Plan details: DC to [facility]  Planned therapy interventions: patient education and home exercise program  Treatment plan discussed with: patient        PT Pelvic Floor Subjective:   History of Present Illness:   Pt continues to have pain prior to erection  Pt has not had any changes relative to pelvic floor  Pt has appointment for surgical consult urology on 21  Pt denies pain referral to back and abdomen  Pt has been able to ride stationary bike without pain in groin or pelvic floor  Pt denies history of testicular pain  Pt reports that pain is just on the L side  Pt denies pain with stress increase in pressure  Pt states that medication has not been helpful  Pt has not had any imaging at this time  Pt continues to have pain worse with foreplay  Social Support:     Lives with:  Spouse    Relationship status: /committed    Work status: employed full time ([employer] work for [employer])    Life stress severity: mild    History of Depression: no  Diet and Exercise:    Diet:balanced nutrition    Exercise type: biking    Exercise frequency: daily    Skiing, swimming  Bladder Function:     Voiding Difficulties negative for: urgency, frequent urination, hesitancy, straining and incomplete emptying      Voiding Difficulties comments:     Urinary leakage: no urine leakage    Nocturia (episodes per night): 2 and 3    Painful urination: No      Fluid Intake Type:  Juice, coffee and alcohol    Intake (ounces): Intake (ounces) comment: Sitting and standing to pee  No hematuria  No splitting/splaying of stream, no change in force of flow  At RE- pt notes nocturia worse with milk or alcohol  Bowel Function:     Bowel frequency: multiple times a day    Ballard Stool Scale: type 3  Sexual Function:     Sexually Active:  Sexually active    Pain during intercourse: Yes (before intercourse, when touched)      pain does not cause abstinence  Pain:     At best pain ratin    At worst pain ratin    Location:  L testicle    Onset:  More than 2 years ago    Quality:  Knife-like    Exacerbated by: touch, foreplay      Duration of symptoms:  Less than 1 hour    Progression:  No change  Diagnostic Tests:     Ultrasound: normal  Treatments: Upcoming doctor's appointment: yes  Date of next appointment: 5/7/2021  Patient Goals:     Patient goals for therapy:  Improved comfort, decreased pain, decreased interpersonal problems and improved quality of life    Other patient goals:  Not have the pain anymore- not met      Objective     Static Posture     Comments  Thoracic extension on lumbar, - gillet's, flattened lordosis; unchanged at RE    Lumbar Screen  Lumbar range of motion within normal limits with the following exceptions:Flexion limited by hamstring tightness, otherwise WNL; no changes at RE    Strength/Myotome Testing     Left Hip   Planes of Motion   Flexion: 5  External rotation: 5  Internal rotation: 5    Right Hip   Planes of Motion   Flexion: 5  External rotation: 5  Internal rotation: 5    Tests     Left Hip   Negative MARY, FADIR and Gillet's  SLR: Negative  Right Hip   Negative MARY, FADIR and Gillet's  SLR: Negative  Additional Tests Details  Tight hip ER and hamstrings B; at RE improved but persists  Pelvic Floor Exam   Position: supine exam  Abdominal assessment: No TTP, feels somewhat distended, slight tightness over L lower rectus    Diastatis   Diastasis recti present: yes  3" above umbilicus (# fingers): 1  Umbilicus (# fingers): 2 5  3" below umbilicus (# fingers): 0  Connective tissue integrity at linea alba: firm  no tenderness at linea alba  unable to engage transverse abdominis (overflow activation with glutes)   Palpation of linea alba: Some visualization of doming with movement, shallow with testing    Skin inspection:   no scars present  General Perineum Exam:   perineum intact       Visual Inspection of Perineum:   Excursion of perineal body in cephalad direction with contraction of pelvic floor muscles (PFM): good  Excursion of perineal body in caudal direction with relaxation of pelvic floor muscles (PFM): fair   Involuntary contraction with coughing: yes  Involuntary relaxation with bearing down: yes  PFM Contraction Comments: Anal wink present  With strong pelvic floor contraction also recruited glutes, able to correct with verbal cuing  Cotton swab test: non-tender  Cough reflex: cough reflex  Sphincter Tone Resting: normal  Sphincter Tone Squeeze: normal  Sensation: intact    Pelvic Floor Muscle Exam     Muscle Contraction: well isolated, overflow and more difficulty relaxing with each successive rep; at RE able to relax  Breathing pattern with contraction: within normal limits  Pelvic floor muscle relaxation is delayed and complete  4 seconds required for complete relaxation       PERFECT Score   Power right: 4+/5  Power left: 4+/5  Endurance (seconds to max): 10  Repetitions (before fatigue): 8  Fast flicks (in 10 seconds): 10               Precautions: GERD      Manuals 1/11 2/17 3/3 3/10 3/17 3/24       Adductor STM nv ED L, ED nv np np       Lower abdominal skin rolling nv ED ED ED ED np       L abdominals cupping  ED ED ED np np       RE      ED       Neuro Re-Ed 1/11 2/17 3/3 3/10 3/17 3/24       Diaphragmatic breathing nv nv nv nv nv np                                                                                     Ther Ex 1/11 2/17 3/3 3/10 3/17 3/24       Piriformis stretch 2x30" ea 3x30" ea 30"x3 ea 30"x3 ea nv np       Adductor LLLD stretch nv 3 min 3 min 3 min 3 min np       Open book stretch nv 10x ea 10x ea nv 10x ea np       LTR nv 3"x10 ea 5"x10 ea 5"x10 ea 3"x10 ea np       Hip flexor stretch EOT nv 30"x3 ea 30"x3 ea 30"x3 ea 30"x3 ea np       APT/PPT nv 10x nv 25x ea, + LPT nv np       Recumbent bike   5' L3 5' L1 5' L3 np       pball 3 way flexion stretch   30"x3 ea 30"x3 ea 30"x3 ea np       bridge   3"x15 3"x20 3"x20 np       Prone on elbows   5"x5 5"x10 5"x10 np       Prone superman    5"x10 5"x10 np       Renetta wall slides    L 10x3" nv np       Ther Activity                                       Gait Training                                       Modalities

## 2021-03-31 ENCOUNTER — APPOINTMENT (OUTPATIENT)
Dept: PHYSICAL THERAPY | Facility: REHABILITATION | Age: 46
End: 2021-03-31
Payer: COMMERCIAL

## 2021-04-26 LAB — HCV AB SER-ACNC: NEGATIVE

## 2021-04-29 ENCOUNTER — TELEPHONE (OUTPATIENT)
Dept: INTERNAL MEDICINE CLINIC | Facility: CLINIC | Age: 46
End: 2021-04-29

## 2021-04-29 NOTE — TELEPHONE ENCOUNTER
----- Message from Newton Nguyen DO sent at 4/28/2021  2:34 PM EDT -----  Please let patient know his liver enzymes and hepatitis panel were normal  He likely had a slight viral infection at that time and his body cleared it out

## 2021-05-03 PROBLEM — N53.12 PAINFUL EJACULATION: Status: ACTIVE | Noted: 2021-05-03

## 2021-05-03 PROBLEM — N50.812 PAIN IN LEFT TESTICLE: Status: ACTIVE | Noted: 2021-05-03

## 2021-05-03 NOTE — PATIENT INSTRUCTIONS
Scrotal Pain   WHAT YOU NEED TO KNOW:   What do I need to know about scrotal pain? Scrotal pain can happen at any age  The cause of scrotal pain can range from a minor injury to a serious medical condition  It is very important to seek immediate care if you have scrotal pain  The pain may be a warning sign of a serious condition that will need treatment  Without immediate care, you may be at increased risk for losing a testicle or being sterile (not having children)  What may cause scrotal pain? · Torsion (twisting) of the testicle, cord that carries sperm from the testicle, or tissue attached to the testicle    · An infection of the testicle or other area in the scrotum    · A hydrocele (fluid buildup around the testicle) or varicocele (blood backup in veins in the scrotum)    · An inguinal hernia (tissue pushed out of place in your groin)    · Ariadna gangrene (tissue death of the area between the scrotum and anus)    · A urinary tract infection or stone that is passing, or an infected appendix    · An injury in your groin or scrotum    What are the warning signs of a serious medical problem? Seek care immediately if you have any of the following:  · Pain that starts suddenly or is severe    · Swelling in your scrotum or groin, especially if you also have severe pain or are vomiting    · Red or black patches of skin on your scrotum or area between your penis and anus    · Blisters anywhere in your groin or scrotum    · A fever    How is the cause of scrotal pain diagnosed? Your healthcare provider will examine you and ask about your pain  Tell the provider when the pain started and how long it lasts  Your provider will ask if pain started in another area and moved to your scrotum  The pain may also have moved from your scrotum to another area  Tell your provider if you have pain during exercise or if you had an injury to your groin   Also tell your provider if you have any problems urinating or if any discharge came out of your penis  Your provider may also ask about your sexual activity  · Blood tests  may be used to check for signs of infection  · Ultrasound pictures  may show a problem with your testicles or tissues in your scrotum  An ultrasound may also show kidney stones or other problems that may be causing your pain  How is scrotal pain treated? Treatment will depend on the cause of your pain:  · Prescription pain medicine  may be given  Ask your healthcare provider how to take this medicine safely  Some prescription pain medicines contain acetaminophen  Do not take other medicines that contain acetaminophen without talking to your healthcare provider  Too much acetaminophen may cause liver damage  Prescription pain medicine may cause constipation  Ask your healthcare provider how to prevent or treat constipation  · NSAIDs , such as ibuprofen, help decrease swelling, pain, and fever  This medicine is available with or without a doctor's order  NSAIDs can cause stomach bleeding or kidney problems in certain people  If you take blood thinner medicine, always ask your healthcare provider if NSAIDs are safe for you  Always read the medicine label and follow directions  · Antibiotics  are used to treat a bacterial infection  · Surgery  may be needed to untwist the testicle, or cord, or to remove dead or infected tissue  What can I do to manage my symptoms? · Wear a support device, if directed  A support device, such as a jock strap, can help keep your scrotum lifted and supported  This can help decrease pain  · Apply ice to your scrotum  Ice helps decrease pain and swelling  Use an ice pack, or put crushed ice in a plastic bag  Cover the pack or bag with a towel before you apply it to your scrotum  Apply ice for 15 to 20 minutes every hour, or as directed  When should I seek immediate care? · You have any warning signs of a serious problem      · You have pain or swelling that starts or gets worse quickly  · You have skin changes in your scrotum, such as a dark patch  · You have a fever  When should I contact my healthcare provider? · Your pain does not get better, even after you take pain medicine  · You have new or worsening pain  · You have questions or concerns about your condition or care  CARE AGREEMENT:   You have the right to help plan your care  Learn about your health condition and how it may be treated  Discuss treatment options with your healthcare providers to decide what care you want to receive  You always have the right to refuse treatment  The above information is an  only  It is not intended as medical advice for individual conditions or treatments  Talk to your doctor, nurse or pharmacist before following any medical regimen to see if it is safe and effective for you  © Copyright [address] Information is for End User's use only and may not be sold, redistributed or otherwise used for commercial purposes   All illustrations and images included in CareNotes® are the copyrighted property of [employer]  or [address] [place] [address]
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
## 2021-05-04 ENCOUNTER — OFFICE VISIT (OUTPATIENT)
Dept: UROLOGY | Facility: CLINIC | Age: 46
End: 2021-05-04
Payer: COMMERCIAL

## 2021-05-04 ENCOUNTER — TELEPHONE (OUTPATIENT)
Dept: UROLOGY | Facility: CLINIC | Age: 46
End: 2021-05-04

## 2021-05-04 VITALS
HEIGHT: 69 IN | DIASTOLIC BLOOD PRESSURE: 64 MMHG | SYSTOLIC BLOOD PRESSURE: 128 MMHG | WEIGHT: 189 LBS | BODY MASS INDEX: 27.99 KG/M2

## 2021-05-04 DIAGNOSIS — F52.4 PREMATURE EJACULATION: ICD-10-CM

## 2021-05-04 DIAGNOSIS — N53.12 PAINFUL EJACULATION: Primary | ICD-10-CM

## 2021-05-04 DIAGNOSIS — N50.812 PAIN IN LEFT TESTICLE: ICD-10-CM

## 2021-05-04 DIAGNOSIS — R39.9 LOWER URINARY TRACT SYMPTOMS (LUTS): ICD-10-CM

## 2021-05-04 PROCEDURE — 1036F TOBACCO NON-USER: CPT | Performed by: UROLOGY

## 2021-05-04 PROCEDURE — 3008F BODY MASS INDEX DOCD: CPT | Performed by: UROLOGY

## 2021-05-04 PROCEDURE — 99214 OFFICE O/P EST MOD 30 MIN: CPT | Performed by: UROLOGY

## 2021-05-06 NOTE — TELEPHONE ENCOUNTER
Patient tentatively scheduled 6/28/2021 at 2pm at the [facility] office with Dr Elias Bajwa to call and confirm appt time and date

## 2021-06-16 NOTE — TELEPHONE ENCOUNTER
Patient called in stating [place] be [place] on 6[place]28  Previous notes state patient will be scheduled with Dr Jalaine Hodgkins, no available slots to reschedule patient at this time  Can patient be scheduled with a AP?  Patient can be reached at [phone]

## 2021-08-12 NOTE — TELEPHONE ENCOUNTER
Pt calling to reschedule today's 11:30am with Dr Funk states reason is personal ,I was unable to reschedule please review and contact pt directly

## 2021-09-17 ENCOUNTER — OFFICE VISIT (OUTPATIENT)
Dept: INTERNAL MEDICINE CLINIC | Facility: CLINIC | Age: 46
End: 2021-09-17
Payer: COMMERCIAL

## 2021-09-17 VITALS
BODY MASS INDEX: 26.51 KG/M2 | DIASTOLIC BLOOD PRESSURE: 60 MMHG | HEART RATE: 82 BPM | HEIGHT: 69 IN | RESPIRATION RATE: 16 BRPM | SYSTOLIC BLOOD PRESSURE: 120 MMHG | OXYGEN SATURATION: 99 % | WEIGHT: 179 LBS

## 2021-09-17 DIAGNOSIS — N20.0 NEPHROLITHIASIS: ICD-10-CM

## 2021-09-17 DIAGNOSIS — M25.561 CHRONIC PAIN OF BOTH KNEES: Primary | ICD-10-CM

## 2021-09-17 DIAGNOSIS — Z13.220 SCREENING FOR HYPERLIPIDEMIA: ICD-10-CM

## 2021-09-17 DIAGNOSIS — M25.562 CHRONIC PAIN OF BOTH KNEES: Primary | ICD-10-CM

## 2021-09-17 DIAGNOSIS — Z13.29 SCREENING FOR THYROID DISORDER: ICD-10-CM

## 2021-09-17 DIAGNOSIS — G89.29 CHRONIC PAIN OF BOTH KNEES: Primary | ICD-10-CM

## 2021-09-17 DIAGNOSIS — Z23 INFLUENZA VACCINE NEEDED: ICD-10-CM

## 2021-09-17 DIAGNOSIS — N40.0 BENIGN PROSTATIC HYPERPLASIA WITHOUT LOWER URINARY TRACT SYMPTOMS: ICD-10-CM

## 2021-09-17 PROCEDURE — 3725F SCREEN DEPRESSION PERFORMED: CPT | Performed by: INTERNAL MEDICINE

## 2021-09-17 PROCEDURE — 3008F BODY MASS INDEX DOCD: CPT | Performed by: INTERNAL MEDICINE

## 2021-09-17 PROCEDURE — 99396 PREV VISIT EST AGE 40-64: CPT | Performed by: INTERNAL MEDICINE

## 2021-09-17 PROCEDURE — 90471 IMMUNIZATION ADMIN: CPT

## 2021-09-17 PROCEDURE — 1036F TOBACCO NON-USER: CPT | Performed by: INTERNAL MEDICINE

## 2021-09-17 PROCEDURE — 90686 IIV4 VACC NO PRSV 0.5 ML IM: CPT

## 2021-09-18 NOTE — PROGRESS NOTES
Assessment/Plan:    #Orchitis  -noted with pain in testicle  -seeing urology with plans for possible cord block vs cord dissection    #GERD  -remains stable    #Bloody Stool  -underwent colonoscopy and EGD 2021 which were unremarkable    #Knee Pain  -chronic secondary to work  -encouraged to use knee pads    #Kidney Stone  -previous history of  -2020 US kidney negative for stone  -currently asymptomatic    #Health Maintenance  -routine labs and followup 1 year  -TDAP script given  -works for [employer]   -covid vaccine up to date    No problem-specific Assessment & Plan notes found for this encounter  Diagnoses and all orders for this visit:    Chronic pain of both knees  -     CBC and differential  -     Comprehensive metabolic panel    Nephrolithiasis  -     CBC and differential  -     Comprehensive metabolic panel    Benign prostatic hyperplasia without lower urinary tract symptoms  -     CBC and differential  -     Comprehensive metabolic panel  -     PSA, total and free    Screening for thyroid disorder  -     CBC and differential  -     Comprehensive metabolic panel  -     TSH, 3rd generation with Free T4 reflex    Screening for hyperlipidemia  -     CBC and differential  -     Comprehensive metabolic panel  -     Lipid Panel With Direct LDL    Influenza vaccine needed  -     influenza vaccine, quadrivalent, 0 5 mL, preservative-free, for adult and pediatric patients 6 mos+ (AFLURIA, FLUARIX, FLULAVAL, FLUZONE)          No current outpatient medications on file  Subjective:      Patient ID: Leoncio Moya is a 42 y o  male  HPI       Patient presents for routine visit follow-up  He had previous episodes of bloody stool however he will underwent an EGD and a colonoscopy which were unrevealing  He states that his body bleeding has discontinued  He has GERD however is no longer taking Pepcid  He is not currently taking any medications  Reports that his to sick earlier pain is improved    He will follow-up with urology  Urology will consider Nerve block verses cord dissection  Patient reports that he received the Tdap approximately 8 years ago  We did give him a script for this to get rifaximin 80  He has a history of nephrolithiasis however is currently stable and he denies any discomfort at this time  We will continue to monitor  He has chronic pain in bilateral knees  We encouraged him to wear knee pads and ice as needed  Patient will return to care in 1 year with labs  The following portions of the patient's history were reviewed and updated as appropriate: allergies, current medications, past family history, past medical history, past social history, past surgical history and problem list     Review of Systems   Constitutional: Negative for activity change, appetite change, fatigue and fever  HENT: Negative for congestion, ear pain, hearing loss, sore throat and tinnitus  Eyes: Negative for photophobia, pain and visual disturbance  Respiratory: Negative for cough, shortness of breath and wheezing  Cardiovascular: Negative for chest pain and leg swelling  Gastrointestinal: Negative for abdominal distention, abdominal pain, constipation, diarrhea, nausea and vomiting  Genitourinary: Negative for difficulty urinating, frequency and hematuria  Musculoskeletal: Positive for arthralgias (knees)  Negative for back pain, joint swelling, neck pain and neck stiffness  Skin: Negative for color change, pallor, rash and wound  Neurological: Negative for dizziness, tremors, numbness and headaches  Hematological: Does not bruise/bleed easily  Objective:      /60   Pulse 82   Resp 16   Ht 5' 9" (1 753 m)   Wt 81 2 kg (179 lb) Comment: verbal  SpO2 99%   BMI 26 43 kg/m²          Physical Exam  Vitals reviewed  Constitutional:       Appearance: Normal appearance  He is well-developed  HENT:      Head: Normocephalic and atraumatic        Right Ear: Tympanic membrane, ear canal and external ear normal  There is no impacted cerumen  Left Ear: Tympanic membrane, ear canal and external ear normal  There is no impacted cerumen  Eyes:      Conjunctiva/sclera: Conjunctivae normal       Pupils: Pupils are equal, round, and reactive to light  Neck:      Thyroid: No thyromegaly  Vascular: No JVD  Cardiovascular:      Rate and Rhythm: Normal rate and regular rhythm  Heart sounds: Normal heart sounds  No murmur heard  Pulmonary:      Effort: Pulmonary effort is normal  No respiratory distress  Breath sounds: Normal breath sounds  No stridor  No wheezing, rhonchi or rales  Abdominal:      General: Bowel sounds are normal  There is no distension  Palpations: Abdomen is soft  There is no mass  Tenderness: There is no abdominal tenderness  There is no guarding or rebound  Musculoskeletal:         General: Tenderness (knees) present  No deformity  Normal range of motion  Cervical back: Normal range of motion and neck supple  Right lower leg: No edema  Left lower leg: No edema  Lymphadenopathy:      Cervical: No cervical adenopathy  Skin:     General: Skin is warm and dry  Findings: No erythema, lesion or rash  Neurological:      Mental Status: He is alert and oriented to person, place, and time  Mental status is at baseline  Deep Tendon Reflexes: Reflexes are normal and symmetric  This note was completed in part utilizing [employer] direct voice recognition software  Grammatical errors, random word insertion, spelling mistakes, and incomplete sentences may be an occasional consequence of the system secondary to software limitations, ambient noise and hardware issues  At the time of dictation, efforts were made to edit, clarify and /or correct errors  Please read the chart carefully and recognize, using context, where substitutions have occurred    If you have any questions or concerns about the context, text or information contained within the body of this dictation, please contact myself, the provider, for further clarification

## 2022-01-06 ENCOUNTER — TELEPHONE (OUTPATIENT)
Dept: INTERNAL MEDICINE CLINIC | Facility: CLINIC | Age: 47
End: 2022-01-06

## 2022-01-06 NOTE — TELEPHONE ENCOUNTER
Please have him quarantine for 5 days from when symptoms started and then he can go about his daily routine but wear a facemask for at least 5 days

## 2022-01-06 NOTE — TELEPHONE ENCOUNTER
PT CALLED, SAID HE WENT TO TWO DIFFERENT URGENT CARES AND BOTH DID COVID TESTS   PT SAID THAT HE'S POSITIVE BUT DOESN'T REALLY ANY SYMPTOMS     PT SAID THAT HE HAD A LITTLE COUGH, A LITTLE RUNNY NOSE    THIS IS JUST AN FYI

## 2022-09-23 ENCOUNTER — OFFICE VISIT (OUTPATIENT)
Dept: INTERNAL MEDICINE CLINIC | Facility: CLINIC | Age: 47
End: 2022-09-23
Payer: COMMERCIAL

## 2022-09-23 VITALS
SYSTOLIC BLOOD PRESSURE: 122 MMHG | BODY MASS INDEX: 29.03 KG/M2 | DIASTOLIC BLOOD PRESSURE: 78 MMHG | HEART RATE: 86 BPM | OXYGEN SATURATION: 97 % | WEIGHT: 196 LBS | HEIGHT: 69 IN | RESPIRATION RATE: 16 BRPM

## 2022-09-23 DIAGNOSIS — G25.81 RESTLESS LEGS: ICD-10-CM

## 2022-09-23 DIAGNOSIS — Z13.220 SCREENING FOR HYPERLIPIDEMIA: ICD-10-CM

## 2022-09-23 DIAGNOSIS — Z12.5 SCREENING FOR PROSTATE CANCER: ICD-10-CM

## 2022-09-23 DIAGNOSIS — Z12.83 SKIN EXAM FOR MALIGNANT NEOPLASM: ICD-10-CM

## 2022-09-23 DIAGNOSIS — Z12.11 SCREENING FOR COLON CANCER: ICD-10-CM

## 2022-09-23 DIAGNOSIS — Z23 ENCOUNTER FOR IMMUNIZATION: ICD-10-CM

## 2022-09-23 DIAGNOSIS — N20.0 NEPHROLITHIASIS: Primary | ICD-10-CM

## 2022-09-23 DIAGNOSIS — Z13.29 SCREENING FOR THYROID DISORDER: ICD-10-CM

## 2022-09-23 DIAGNOSIS — Z13.1 SCREENING FOR DIABETES MELLITUS: ICD-10-CM

## 2022-09-23 DIAGNOSIS — Z23 INFLUENZA VACCINE NEEDED: ICD-10-CM

## 2022-09-23 PROCEDURE — 99396 PREV VISIT EST AGE 40-64: CPT | Performed by: INTERNAL MEDICINE

## 2022-09-23 PROCEDURE — 90471 IMMUNIZATION ADMIN: CPT | Performed by: INTERNAL MEDICINE

## 2022-09-23 PROCEDURE — 90686 IIV4 VACC NO PRSV 0.5 ML IM: CPT | Performed by: INTERNAL MEDICINE

## 2022-09-23 NOTE — PROGRESS NOTES
Assessment/Plan:    #Orchitis  -noted with pain in testicle previously with painful ejaculation  -US scrotum and testes normal 2020  -saw urology with plans for possible cord block vs cord dissection but deferred  Vs starting off label cymbalta and symptoms now stable without intervention    #COVID  -infection in January 2022 and again about 1 month ago  -now resolved without intervention    #Restless Legs  -drinking about 2 ounces water daily  -will check magnesium, iron panel  -encouraged tonic water and drinking more water    #Nevi  -multiple on skin  -refer to dermatology for skin exam    #GERD  -remains stable and was on famotidine at one time but now off     #Bloody Stool  -underwent EGD 2021 which show mild inflammation and irregular squamocolumnar junction    #Knee Pain  -chronic secondary to work  -encouraged to use knee pads     #Kidney Stone  -previous history of  -2020 US kidney negative for stone  -has left flank pain whenever laying on right side  -will obtain CT renal study    #Health Maintenance  -routine labs and followup 1 year  -TDAP script given  -works for [employer]   -covid vaccine up to date, defers booster for now  -flu vaccine today  -colonoscopy due    No problem-specific Assessment & Plan notes found for this encounter  Diagnoses and all orders for this visit:    Nephrolithiasis  -     CBC and differential  -     Comprehensive metabolic panel  -     CT renal protocol;  Future    Screening for diabetes mellitus  -     CBC and differential  -     Comprehensive metabolic panel    Screening for prostate cancer  -     CBC and differential  -     PSA, total and free  -     Comprehensive metabolic panel    Screening for hyperlipidemia  -     CBC and differential  -     Comprehensive metabolic panel  -     Lipid Panel with Direct LDL reflex    Screening for thyroid disorder  -     CBC and differential  -     TSH, 3rd generation with Free T4 reflex  -     Comprehensive metabolic panel    Restless legs  -     CBC and differential  -     Comprehensive metabolic panel  -     Iron, TIBC and Ferritin Panel  -     Magnesium    Screening for colon cancer  -     Ambulatory referral for colonoscopy; Future    Influenza vaccine needed  -     influenza vaccine, quadrivalent, 0 5 mL, preservative-free, for adult and pediatric patients 6 mos+ (AFLURIA, FLUARIX, FLULAVAL, FLUZONE)    Encounter for immunization  -     tetanus-diphtheria-acellular pertussis (ADACEL) 5-2-15 5 LF-mcg/0 5 injection; Inject 0 5 mL into a muscle once for 1 dose            Current Outpatient Medications:     tetanus-diphtheria-acellular pertussis (ADACEL) 5-2-15 5 LF-mcg/0 5 injection, Inject 0 5 mL into a muscle once for 1 dose, Disp: 0 5 mL, Rfl: 0    Subjective:      Patient ID: Kenny Forman is a 41 y o  male  HPI     Patient presents for routine checkup  Denies any recent hospitalizations or surgeries  He had COVID a month ago and again last year  He has since recovered  He states that his symptoms were very mild with an itchy nose and runny eyes  He is due for the COVID booster however he defers this  He has received the 1st booster are ready  He will receive the flu vaccine today  He is due for screening colonoscopy and we will arrange for this  He is also due for his Tdap vaccine and we gave him the script  The he has restless leg syndrome and reports that he has to move his legs often at night  He only drinks about 2 oz of water a day  Encouraged him to increase his fluid intake and check a magnesium and iron panel  His he also has a history of nephrolithiasis  Previous ultrasound of the kidney was unremarkable  Patient reports that he gets flank tenderness over the left side whenever he lays down onto his right side  We will check a CT renal study  He will return to care in 1 year      The following portions of the patient's history were reviewed and updated as appropriate: allergies, current medications, past family history, past medical history, past social history, past surgical history and problem list     Review of Systems   Constitutional: Negative for activity change, appetite change, fatigue and fever  HENT: Negative for congestion, ear pain, hearing loss, sore throat and tinnitus  Eyes: Negative for photophobia, pain and visual disturbance  Respiratory: Negative for cough, shortness of breath and wheezing  Cardiovascular: Negative for chest pain, palpitations and leg swelling  Gastrointestinal: Negative for abdominal distention, abdominal pain, constipation, diarrhea, nausea and vomiting  Genitourinary: Positive for flank pain  Negative for difficulty urinating, frequency and hematuria  Musculoskeletal: Positive for arthralgias (knees)  Negative for back pain, gait problem, joint swelling, myalgias, neck pain and neck stiffness  Skin: Negative for color change, pallor, rash and wound  Neurological: Negative for dizziness, tremors, seizures, weakness, numbness and headaches  Restless legs   Hematological: Does not bruise/bleed easily  Objective:      /78   Pulse 86   Resp 16   Ht 5' 9" (1 753 m)   Wt 88 9 kg (196 lb)   SpO2 97%   BMI 28 94 kg/m²          Physical Exam  Vitals reviewed  Constitutional:       Appearance: He is well-developed  HENT:      Head: Normocephalic and atraumatic  Right Ear: External ear normal       Left Ear: External ear normal       Nose: Nose normal    Eyes:      Conjunctiva/sclera: Conjunctivae normal       Pupils: Pupils are equal, round, and reactive to light  Neck:      Thyroid: No thyromegaly  Vascular: No JVD  Cardiovascular:      Rate and Rhythm: Normal rate and regular rhythm  Heart sounds: Normal heart sounds  No murmur heard  Pulmonary:      Effort: Pulmonary effort is normal  No respiratory distress  Breath sounds: Normal breath sounds  No wheezing     Abdominal:      General: Bowel sounds are normal  There is no distension  Palpations: Abdomen is soft  There is no mass  Tenderness: There is no abdominal tenderness  There is left CVA tenderness  There is no right CVA tenderness, guarding or rebound  Musculoskeletal:         General: Tenderness (knees) present  No deformity  Normal range of motion  Cervical back: Normal range of motion and neck supple  Right lower leg: No edema  Left lower leg: No edema  Lymphadenopathy:      Cervical: No cervical adenopathy  Skin:     General: Skin is warm and dry  Findings: Lesion (multiple nevi) present  No erythema or rash  Neurological:      Mental Status: He is alert and oriented to person, place, and time  Mental status is at baseline  Motor: No weakness  Gait: Gait normal       Deep Tendon Reflexes: Reflexes are normal and symmetric  Psychiatric:         Mood and Affect: Mood normal          Behavior: Behavior normal          Thought Content: Thought content normal          Judgment: Judgment normal            This note was completed in part utilizing [employer] direct voice recognition software  Grammatical errors, random word insertion, spelling mistakes, and incomplete sentences may be an occasional consequence of the system secondary to software limitations, ambient noise and hardware issues  At the time of dictation, efforts were made to edit, clarify and /or correct errors  Please read the chart carefully and recognize, using context, where substitutions have occurred  If you have any questions or concerns about the context, text or information contained within the body of this dictation, please contact myself, the provider, for further clarification

## 2023-07-11 ENCOUNTER — TELEPHONE (OUTPATIENT)
Dept: INTERNAL MEDICINE CLINIC | Facility: CLINIC | Age: 48
End: 2023-07-11

## 2023-07-11 NOTE — TELEPHONE ENCOUNTER
----- Message from Idalmis Carl DO sent at 7/10/2023  8:03 PM EDT -----  Please let patient know his labs came back stable. No issues thyroid, electrolytes, blood counts, cholesterol. Please schedule him for his yearly physical in September.

## 2024-04-09 ENCOUNTER — TELEPHONE (OUTPATIENT)
Age: 49
End: 2024-04-09

## 2024-04-09 NOTE — TELEPHONE ENCOUNTER
Pt called regarding Travel [facility] and what immunizations he will need. Advised I can not tell him; however, he can schedule an appointment for a consultation with the doctor to review. Advised insurance does not cover travel clinic and all services/immunizations are paid out of pocket; immunizations should be done 4 weeks prior to travel, in emergency can be done 2 weeks prior to travel. Pt wanted to look into what he might need prior to scheduling appt. advised to look at [facility] website. Pt in agreement and will look into CDC and call back.

## 2024-06-25 ENCOUNTER — OFFICE VISIT (OUTPATIENT)
Dept: INTERNAL MEDICINE CLINIC | Facility: CLINIC | Age: 49
End: 2024-06-25
Payer: COMMERCIAL

## 2024-06-25 VITALS
SYSTOLIC BLOOD PRESSURE: 112 MMHG | DIASTOLIC BLOOD PRESSURE: 56 MMHG | HEART RATE: 74 BPM | BODY MASS INDEX: 27.25 KG/M2 | OXYGEN SATURATION: 99 % | HEIGHT: 69 IN | WEIGHT: 184 LBS | TEMPERATURE: 98.1 F

## 2024-06-25 DIAGNOSIS — Z12.5 PROSTATE CANCER SCREENING: ICD-10-CM

## 2024-06-25 DIAGNOSIS — Z12.11 SCREENING FOR COLON CANCER: Primary | ICD-10-CM

## 2024-06-25 DIAGNOSIS — Z00.00 ANNUAL PHYSICAL EXAM: ICD-10-CM

## 2024-06-25 DIAGNOSIS — Z76.89 ESTABLISHING CARE WITH NEW DOCTOR, ENCOUNTER FOR: ICD-10-CM

## 2024-06-25 PROCEDURE — 99396 PREV VISIT EST AGE 40-64: CPT | Performed by: INTERNAL MEDICINE

## 2024-06-25 NOTE — PROGRESS NOTES
Adult Annual Physical  Name: Kedar Bustillo      : 1975      MRN: [id number]  Encounter Provider: Chiquita Pitt MD  Encounter Date: 2024   Encounter department: [facility] INTERNAL MEDICINE    Assessment & Plan   1. Screening for colon cancer  -     Ambulatory Referral to Gastroenterology; Future  2. Establishing care with new doctor, encounter for  3. Annual physical exam  -     CBC and differential; Future  -     Comprehensive metabolic panel; Future  -     Lipid panel; Future  4. Prostate cancer screening  -     PSA, Total Screen; Future    Establish care  Moving from another PCP office, denies any subjective complaints, chronic medical conditions or medications,  Discussed lifestyle modifications for weight loss, annual blood work ordered, follow-up in 1 year        Immunizations and preventive care screenings were discussed with patient today. Appropriate education was printed on patient's after visit summary.    Discussed risks and benefits of prostate cancer screening. We discussed the controversial history of PSA screening for prostate cancer in the [place] as well as the risk of over detection and over treatment of prostate cancer by way of PSA screening.  The patient understands that PSA blood testing is an imperfect way to screen for prostate cancer and that elevated PSA levels in the blood may also be caused by infection, inflammation, prostatic trauma or manipulation, urological procedures, or by benign prostatic enlargement.    The role of the digital rectal examination in prostate cancer screening was also discussed and I discussed with him that there is large interobserver variability in the findings of digital rectal examination.    Counseling:  Exercise: the importance of regular exercise/physical activity was discussed. Recommend exercise 3-5 times per week for at least 30 minutes.          History of Present Illness     Adult Annual Physical:  Patient presents  "for annual physical.     Diet and Physical Activity:  - Diet/Nutrition: well balanced diet and portion control.  - Exercise: no formal exercise.    Depression Screening:  - PHQ-2 Score: 0    General Health:  - Sleep: sleeps well.  - Hearing: normal hearing right ear and normal hearing left ear.  - Vision: no vision problems and wears glasses.  - Dental: regular dental visits.     Health:  - History of STDs: no.   - Urinary symptoms: none.     Advanced Care Planning:  - Has an advanced directive?: no    - Has a durable medical POA?: no    - ACP document given to patient?: no      Review of Systems   Constitutional:  Negative for activity change, appetite change, chills, diaphoresis, fatigue, fever and unexpected weight change.   HENT:  Negative for congestion and sore throat.    Respiratory:  Negative for apnea, cough, choking, chest tightness, shortness of breath, wheezing and stridor.    Cardiovascular:  Negative for chest pain, palpitations and leg swelling.   Gastrointestinal:  Negative for abdominal distention, abdominal pain, blood in stool, constipation, nausea and rectal pain.   Genitourinary:  Negative for dysuria, flank pain, frequency and urgency.   Musculoskeletal:  Negative for arthralgias, back pain, gait problem, joint swelling and myalgias.   Skin:  Negative for color change, pallor and rash.   Neurological:  Negative for headaches.     No current outpatient medications on file prior to visit.     No current facility-administered medications on file prior to visit.        Objective     /56 (BP Location: Left arm, Patient Position: Sitting, Cuff Size: Standard)   Pulse 74   Temp 98.1 °F (36.7 °C) (Temporal)   Ht 5' 9\" (1.753 m)   Wt 83.5 kg (184 lb)   SpO2 99%   BMI 27.17 kg/m²     Physical Exam  Constitutional:       General: He is not in acute distress.     Appearance: Normal appearance. He is normal weight. He is not ill-appearing, toxic-appearing or diaphoretic.   Neck:      Thyroid: " No thyromegaly.      Vascular: No carotid bruit.   Cardiovascular:      Rate and Rhythm: Normal rate and regular rhythm.      Pulses: Normal pulses.      Heart sounds: Normal heart sounds. No murmur heard.     No gallop.   Pulmonary:      Effort: Pulmonary effort is normal. No respiratory distress.      Breath sounds: Normal breath sounds. No stridor. No wheezing, rhonchi or rales.   Chest:      Chest wall: No tenderness.   Abdominal:      General: There is no distension.      Palpations: Abdomen is soft.      Tenderness: There is no abdominal tenderness. There is no guarding.   Musculoskeletal:      Right lower leg: No edema.      Left lower leg: No edema.   Skin:     General: Skin is warm and dry.   Neurological:      Mental Status: He is alert and oriented to person, place, and time.       Administrative Statements

## 2024-07-11 ENCOUNTER — TELEPHONE (OUTPATIENT)
Age: 49
End: 2024-07-11

## 2024-07-11 ENCOUNTER — PREP FOR PROCEDURE (OUTPATIENT)
Age: 49
End: 2024-07-11

## 2024-07-11 DIAGNOSIS — Z12.11 SCREENING FOR COLON CANCER: Primary | ICD-10-CM

## 2024-07-11 NOTE — TELEPHONE ENCOUNTER
Scheduled date of colonoscopy (as of today):8/16/24  Physician performing colonoscopy:  [facility]   Location of colonoscopy: [facility] ROOM   Bowel prep reviewed with patient: blaise soto  Instructions to confirmed email     07/11/24  Screened by: Maye Green    Referring Provider     Pre- Screening:     There is no height or weight on file to calculate BMI. 27.17  Has patient been referred for a routine screening Colonoscopy? yes  Is the patient between 45-75 years old? yes      Previous Colonoscopy no   If yes:    Date:     Facility:     Reason:       Does the patient want to see a Gastroenterologist prior to their procedure OR are they having any GI symptoms? no    Has the patient been hospitalized or had abdominal surgery in the past 6 months? no    Does the patient use supplemental oxygen? no    Does the patient take Coumadin, Lovenox, Plavix, Elliquis, Xarelto, or other blood thinning medication? no    Has the patient had a stroke, cardiac event, or stent placed in the past year? no    If patient is between 45yrs - 49yrs, please advise patient that we will have to confirm benefits & coverage with their insurance company for a routine screening colonoscopy.

## 2024-07-24 LAB
ALBUMIN SERPL-MCNC: 4.2 G/DL (ref 3.5–5.7)
ALP SERPL-CCNC: 34 U/L (ref 35–120)
ALT SERPL-CCNC: 33 U/L
ANION GAP SERPL CALCULATED.3IONS-SCNC: 10 MMOL/L (ref 3–11)
AST SERPL-CCNC: 19 U/L
BASOPHILS # BLD AUTO: 0.1 THOU/CMM (ref 0–0.1)
BASOPHILS NFR BLD AUTO: 1 %
BILIRUB SERPL-MCNC: 0.6 MG/DL (ref 0.2–1)
BUN SERPL-MCNC: 17 MG/DL (ref 7–28)
CALCIUM SERPL-MCNC: 9.3 MG/DL (ref 8.5–10.1)
CHLORIDE SERPL-SCNC: 106 MMOL/L (ref 100–109)
CHOLEST SERPL-MCNC: 149 MG/DL
CHOLEST/HDLC SERPL: 3.8 {RATIO}
CO2 SERPL-SCNC: 25 MMOL/L (ref 21–31)
CREAT SERPL-MCNC: 0.71 MG/DL (ref 0.53–1.3)
CYTOLOGY CMNT CVX/VAG CYTO-IMP: ABNORMAL
DIFFERENTIAL METHOD BLD: NORMAL
EOSINOPHIL # BLD AUTO: 0.3 THOU/CMM (ref 0–0.5)
EOSINOPHIL NFR BLD AUTO: 4 %
ERYTHROCYTE [DISTWIDTH] IN BLOOD BY AUTOMATED COUNT: 13.1 % (ref 12–16)
GFR/BSA.PRED SERPLBLD CYS-BASED-ARV: 114 ML/MIN/{1.73_M2}
GLUCOSE SERPL-MCNC: 94 MG/DL (ref 65–99)
HCT VFR BLD AUTO: 43 % (ref 37–48)
HDLC SERPL-MCNC: 39 MG/DL (ref 23–92)
HGB BLD-MCNC: 14.6 G/DL (ref 12.5–17)
LDLC SERPL CALC-MCNC: 100 MG/DL
LYMPHOCYTES # BLD AUTO: 1.8 THOU/CMM (ref 1–3)
LYMPHOCYTES NFR BLD AUTO: 26 %
MCH RBC QN AUTO: 31.1 PG (ref 27–36)
MCHC RBC AUTO-ENTMCNC: 33.9 G/DL (ref 32–37)
MCV RBC AUTO: 92 FL (ref 80–100)
MONOCYTES # BLD AUTO: 0.6 THOU/CMM (ref 0.3–1)
MONOCYTES NFR BLD AUTO: 9 %
NEUTROPHILS # BLD AUTO: 4.2 THOU/CMM (ref 1.8–7.8)
NEUTROPHILS NFR BLD AUTO: 60 %
NONHDLC SERPL-MCNC: 110 MG/DL
PLATELET # BLD AUTO: 227 THOU/CMM (ref 140–350)
PMV BLD REES-ECKER: 9.6 FL (ref 7.5–11.3)
POTASSIUM SERPL-SCNC: 4.3 MMOL/L (ref 3.5–5.2)
PROT SERPL-MCNC: 6.8 G/DL (ref 6.3–8.3)
PSA SERPL-MCNC: 0.99 NG/ML
RBC # BLD AUTO: 4.7 MILL/CMM (ref 4–5.4)
SODIUM SERPL-SCNC: 141 MMOL/L (ref 135–145)
TRIGL SERPL-MCNC: 50 MG/DL
WBC # BLD AUTO: 6.9 THOU/CMM (ref 4–10.5)

## 2024-08-02 ENCOUNTER — ANESTHESIA (OUTPATIENT)
Dept: ANESTHESIOLOGY | Facility: HOSPITAL | Age: 49
End: 2024-08-02

## 2024-08-02 ENCOUNTER — ANESTHESIA EVENT (OUTPATIENT)
Dept: ANESTHESIOLOGY | Facility: HOSPITAL | Age: 49
End: 2024-08-02

## 2024-08-05 ENCOUNTER — TELEPHONE (OUTPATIENT)
Dept: GASTROENTEROLOGY | Facility: MEDICAL CENTER | Age: 49
End: 2024-08-05

## 2024-08-05 NOTE — TELEPHONE ENCOUNTER
Confirming Upcoming Procedure: Colonoscopy on August 16  Physician performing: Dr. Martinez  Location of procedure:  [facility]  Prep: Miralax  Diabetic: No

## 2024-08-16 ENCOUNTER — ANESTHESIA EVENT (OUTPATIENT)
Dept: GASTROENTEROLOGY | Facility: MEDICAL CENTER | Age: 49
End: 2024-08-16

## 2024-08-16 ENCOUNTER — ANESTHESIA (OUTPATIENT)
Dept: GASTROENTEROLOGY | Facility: MEDICAL CENTER | Age: 49
End: 2024-08-16

## 2024-08-16 ENCOUNTER — HOSPITAL ENCOUNTER (OUTPATIENT)
Dept: GASTROENTEROLOGY | Facility: MEDICAL CENTER | Age: 49
Setting detail: OUTPATIENT SURGERY
End: 2024-08-16
Payer: COMMERCIAL

## 2024-08-16 VITALS
HEIGHT: 69 IN | BODY MASS INDEX: 26.96 KG/M2 | SYSTOLIC BLOOD PRESSURE: 120 MMHG | OXYGEN SATURATION: 99 % | TEMPERATURE: 97.8 F | WEIGHT: 182 LBS | DIASTOLIC BLOOD PRESSURE: 78 MMHG | HEART RATE: 73 BPM | RESPIRATION RATE: 19 BRPM

## 2024-08-16 DIAGNOSIS — Z12.11 SCREENING FOR COLON CANCER: ICD-10-CM

## 2024-08-16 PROCEDURE — 45385 COLONOSCOPY W/LESION REMOVAL: CPT | Performed by: STUDENT IN AN ORGANIZED HEALTH CARE EDUCATION/TRAINING PROGRAM

## 2024-08-16 PROCEDURE — 88305 TISSUE EXAM BY PATHOLOGIST: CPT | Performed by: PATHOLOGY

## 2024-08-16 RX ORDER — PROPOFOL 10 MG/ML
INJECTION, EMULSION INTRAVENOUS AS NEEDED
Status: DISCONTINUED | OUTPATIENT
Start: 2024-08-16 | End: 2024-08-16

## 2024-08-16 RX ORDER — LIDOCAINE HYDROCHLORIDE 20 MG/ML
INJECTION, SOLUTION EPIDURAL; INFILTRATION; INTRACAUDAL; PERINEURAL AS NEEDED
Status: DISCONTINUED | OUTPATIENT
Start: 2024-08-16 | End: 2024-08-16

## 2024-08-16 RX ORDER — SODIUM CHLORIDE 9 MG/ML
125 INJECTION, SOLUTION INTRAVENOUS CONTINUOUS
Status: DISCONTINUED | OUTPATIENT
Start: 2024-08-16 | End: 2024-08-16

## 2024-08-16 RX ADMIN — PROPOFOL 100 MG: 10 INJECTION, EMULSION INTRAVENOUS at 14:51

## 2024-08-16 RX ADMIN — LIDOCAINE HYDROCHLORIDE 100 MG: 20 INJECTION, SOLUTION EPIDURAL; INFILTRATION; INTRACAUDAL; PERINEURAL at 14:51

## 2024-08-16 RX ADMIN — PROPOFOL 50 MG: 10 INJECTION, EMULSION INTRAVENOUS at 14:53

## 2024-08-16 RX ADMIN — PROPOFOL 50 MG: 10 INJECTION, EMULSION INTRAVENOUS at 15:04

## 2024-08-16 RX ADMIN — PROPOFOL 50 MG: 10 INJECTION, EMULSION INTRAVENOUS at 14:58

## 2024-08-16 RX ADMIN — SODIUM CHLORIDE 125 ML/HR: 0.9 INJECTION, SOLUTION INTRAVENOUS at 14:44

## 2024-08-16 NOTE — ANESTHESIA POSTPROCEDURE EVALUATION
Post-Op Assessment Note    CV Status:  Stable    Pain management: adequate       Mental Status:  Alert and awake   Hydration Status:  Euvolemic   PONV Controlled:  Controlled   Airway Patency:  Patent     Post Op Vitals Reviewed: Yes    No anethesia notable event occurred.    Staff: CRNA               BP   98/55   Temp      Pulse  67   Resp   16   SpO2   96% RA

## 2024-08-16 NOTE — ANESTHESIA PREPROCEDURE EVALUATION
Procedure:  COLONOSCOPY    Relevant Problems   No relevant active problems        Physical Exam    Airway    Mallampati score: II  TM Distance: >3 FB  Neck ROM: full     Dental   No notable dental hx     Cardiovascular      Pulmonary      Other Findings        Anesthesia Plan  ASA Score- 1     Anesthesia Type- IV sedation with anesthesia with ASA Monitors.         Additional Monitors:     Airway Plan:            Plan Factors-Exercise tolerance (METS): >4 METS.    Chart reviewed.    Patient summary reviewed.                  Induction- intravenous.    Postoperative Plan-     Perioperative Resuscitation Plan - Level 1 - Full Code.       Informed Consent- Anesthetic plan and risks discussed with patient.  I personally reviewed this patient with the CRNA. Discussed and agreed on the Anesthesia Plan with the CRNA..

## 2024-08-16 NOTE — H&P
"History and Physical -  [facility]  Kedar Bustillo 49 y.o. male MRN: [id number]          HPI: Kedar Bustillo is a 49 y.o. year old male who presents for open access screening colonoscopy. No family history of colon cancer.      REVIEW OF SYSTEMS: Per the HPI, and otherwise unremarkable.    Historical Information   Past Medical History:   Diagnosis Date    GERD (gastroesophageal reflux disease)      Past Surgical History:   Procedure Laterality Date    NO PAST SURGERIES       Social History   Social History     Substance and Sexual Activity   Alcohol Use Yes    Comment: occ     Social History     Substance and Sexual Activity   Drug Use Never     Social History     Tobacco Use   Smoking Status Never   Smokeless Tobacco Never     Family History   Problem Relation Age of Onset    Coronary artery disease Mother     Hypertension Mother     Hyperlipidemia Mother     Diabetes Maternal Grandmother     Hyperlipidemia Maternal Grandmother     Coronary artery disease Maternal Grandfather     Hyperlipidemia Maternal Grandfather     Alcohol abuse Maternal Uncle     Coronary artery disease Maternal Uncle     Cancer Maternal Uncle     Suicide Attempts Maternal Uncle        Meds/Allergies     No current outpatient medications on file.    Current Facility-Administered Medications:     sodium chloride 0.9 % infusion, 125 mL/hr, Intravenous, Continuous    No Known Allergies    Objective     Ht 5' 9\" (1.753 m)   Wt 82.6 kg (182 lb)   BMI 26.88 kg/m²       PHYSICAL EXAM    GEN: NAD  CARDIO: RRR  PULM: CTA bilaterally  ABD: soft, non-tender, non-distended  EXT: no lower extremity edema  NEURO: AAOx3      ASSESSMENT/PLAN:  49 y.o. year old male here for colonoscopy; he is stable and optimized for his procedure.        "

## 2024-08-20 PROCEDURE — 88305 TISSUE EXAM BY PATHOLOGIST: CPT | Performed by: PATHOLOGY

## 2024-08-23 ENCOUNTER — OFFICE VISIT (OUTPATIENT)
Dept: INTERNAL MEDICINE CLINIC | Facility: CLINIC | Age: 49
End: 2024-08-23
Payer: COMMERCIAL

## 2024-08-23 VITALS
HEART RATE: 78 BPM | HEIGHT: 69 IN | BODY MASS INDEX: 26.96 KG/M2 | OXYGEN SATURATION: 97 % | WEIGHT: 182 LBS | TEMPERATURE: 98.7 F | DIASTOLIC BLOOD PRESSURE: 59 MMHG | SYSTOLIC BLOOD PRESSURE: 116 MMHG

## 2024-08-23 DIAGNOSIS — J01.00 ACUTE NON-RECURRENT MAXILLARY SINUSITIS: Primary | ICD-10-CM

## 2024-08-23 PROCEDURE — 99213 OFFICE O/P EST LOW 20 MIN: CPT | Performed by: INTERNAL MEDICINE

## 2024-08-23 RX ORDER — FLUTICASONE PROPIONATE 50 MCG
1 SPRAY, SUSPENSION (ML) NASAL DAILY
Qty: 10 G | Refills: 2 | Status: SHIPPED | OUTPATIENT
Start: 2024-08-23

## 2024-08-23 RX ORDER — AZITHROMYCIN 250 MG/1
TABLET, FILM COATED ORAL
Qty: 6 TABLET | Refills: 0 | Status: SHIPPED | OUTPATIENT
Start: 2024-08-23 | End: 2024-08-27

## 2024-08-23 NOTE — PROGRESS NOTES
Assessment/Plan:           1. Acute non-recurrent maxillary sinusitis  -     azithromycin (ZITHROMAX) 250 mg tablet; Take 2 tablets today then 1 tablet daily x 4 days  -     sodium chloride (OCEAN) 0.65 % nasal spray; 1 spray into each nostril every 2 (two) hours while awake  -     fluticasone (FLONASE) 50 mcg/act nasal spray; 1 spray into each nostril daily         1. Acute non-recurrent maxillary sinusitis    - azithromycin (ZITHROMAX) 250 mg tablet; Take 2 tablets today then 1 tablet daily x 4 days  Dispense: 6 tablet; Refill: 0  - sodium chloride (OCEAN) 0.65 % nasal spray; 1 spray into each nostril every 2 (two) hours while awake  Dispense: 60 mL; Refill: 1  - fluticasone (FLONASE) 50 mcg/act nasal spray; 1 spray into each nostril daily  Dispense: 10 g; Refill: 2       No problem-specific Assessment & Plan notes found for this encounter.           Subjective:      Patient ID: Kedar Bustillo is a 49 y.o. male.    HPI    The following portions of the patient's history were reviewed and updated as appropriate: He  has a past medical history of GERD (gastroesophageal reflux disease).  He   Patient Active Problem List    Diagnosis Date Noted   • Lower urinary tract symptoms (LUTS) 05/04/2021   • Premature ejaculation 05/04/2021   • Painful ejaculation 05/03/2021   • Pain in left testicle 05/03/2021     He  has a past surgical history that includes No past surgeries.  His family history includes Alcohol abuse in his maternal uncle; Cancer in his maternal uncle; Coronary artery disease in his maternal grandfather, maternal uncle, and mother; Diabetes in his maternal grandmother; Hyperlipidemia in his maternal grandfather, maternal grandmother, and mother; Hypertension in his mother; Suicide Attempts in his maternal uncle.  He  reports that he has never smoked. He has never used smokeless tobacco. He reports current alcohol use. He reports that he does not use drugs.  Current Outpatient Medications   Medication Sig  "Dispense Refill   • azithromycin (ZITHROMAX) 250 mg tablet Take 2 tablets today then 1 tablet daily x 4 days 6 tablet 0   • fluticasone (FLONASE) 50 mcg/act nasal spray 1 spray into each nostril daily 10 g 2   • sodium chloride (OCEAN) 0.65 % nasal spray 1 spray into each nostril every 2 (two) hours while awake 60 mL 1     No current facility-administered medications for this visit.     No current outpatient medications on file prior to visit.     No current facility-administered medications on file prior to visit.     There are no discontinued medications.   He has No Known Allergies..    Review of Systems   Constitutional:  Negative for appetite change, chills, fatigue and fever.   HENT:  Positive for congestion. Negative for sore throat and trouble swallowing.    Eyes:  Negative for redness.   Respiratory:  Negative for shortness of breath.    Cardiovascular:  Negative for chest pain and palpitations.   Gastrointestinal:  Negative for abdominal pain, constipation and diarrhea.   Genitourinary:  Negative for dysuria and hematuria.   Musculoskeletal:  Negative for back pain and neck pain.   Skin:  Negative for rash.   Neurological:  Negative for seizures, weakness and headaches.   Hematological:  Negative for adenopathy.   Psychiatric/Behavioral:  Negative for confusion. The patient is not nervous/anxious.          Objective:      /59 (BP Location: Left arm, Patient Position: Sitting, Cuff Size: Large)   Pulse 78   Temp 98.7 °F (37.1 °C) (Temporal)   Ht 5' 9\" (1.753 m)   Wt 82.6 kg (182 lb)   SpO2 97%   BMI 26.88 kg/m²     Results Reviewed       None            Recent Results (from the past 1344 hour(s))   Comprehensive metabolic panel    Collection Time: 07/24/24  8:23 AM   Result Value Ref Range    Glucose, Random 94 65 - 99 mg/dL    BUN 17 7 - 28 mg/dL    Creatinine 0.71 0.53 - 1.30 mg/dL    Sodium 141 135 - 145 mmol/L    Potassium 4.3 3.5 - 5.2 mmol/L    Chloride 106 100 - 109 mmol/L    CO2 25 21 - " 31 mmol/L    Calcium 9.3 8.5 - 10.1 mg/dL    Alkaline Phosphatase 34 (L) 35 - 120 U/L    Albumin 4.2 3.5 - 5.7 g/dL    TOTAL BILIRUBIN 0.6 0.2 - 1.0 mg/dL    Protein, Total 6.8 6.3 - 8.3 g/dL    AST 19 <41 U/L    ALT 33 <56 U/L    ANION GAP 10 3 - 11    eGFR 114 >59    Comment (Note)    Lipid panel    Collection Time: 07/24/24  8:23 AM   Result Value Ref Range    Cholesterol, Total 149 <200 mg/dL    Triglycerides 50 <150 mg/dL    HDL Cholesterol 39 23 - 92 mg/dL    Non HDL Chol. (LDL+VLDL) 110 <160 mg/dL    LDL Calculated 100 <130 mg/dL    Chol HDLC Ratio 3.8    PSA, Total Screen    Collection Time: 07/24/24  8:23 AM   Result Value Ref Range    Prostate Specific Antigen Total 0.99 <4.00 ng/mL   CBC and differential    Collection Time: 07/24/24  8:23 AM   Result Value Ref Range    Hemoglobin 14.6 12.5 - 17.0 g/dL    HCT 43.0 37.0 - 48.0 %    White Blood Cell Count 6.9 4.0 - 10.5 thou/cmm    Red Blood Cell Count 4.70 4.00 - 5.40 mill/cmm    Platelet Count 227 140 - 350 thou/cmm    SL AMB MPV 9.6 7.5 - 11.3 fL    MCV 92 80 - 100 fL    MCH 31.1 27.0 - 36.0 pg    MCHC 33.9 32.0 - 37.0 g/dL    RDW 13.1 12.0 - 16.0 %    Differential Type AUTO     Neutrophils (Absolute) 4.2 1.8 - 7.8 thou/cmm    Lymphocytes (Absolute) 1.8 1.0 - 3.0 thou/cmm    Monocytes (Absolute) 0.6 0.3 - 1.0 thou/cmm    Eosinophils (Absolute) 0.3 0.0 - 0.5 thou/cmm    Basophils ABS 0.1 0.0 - 0.1 thou/cmm    Neutrophils 60 %    Lymphocytes 26 %    Monocytes 9 %    Eosinophils 4 %    Basophils PCT 1 %   Tissue Exam    Collection Time: 08/16/24  3:04 PM   Result Value Ref Range    Case Report       Surgical Pathology Report                         Case: [id number]                                  Authorizing Provider:  Zackery Martinez MD       Collected:           08/16/2024 1504              Ordering Location:     [facility]        Received:            08/16/2024 [address] [place] [address] Endoscopy                    "                                  Pathologist:           Eliel Conley MD                                                            Specimen:    Large Intestine, Transverse Colon, polyp transverse colon cold snare                       Final Diagnosis       A. Large Intestine, Transverse Colon, polyp transverse colon cold snare:  Strips of colon mucosa with focal hyperplastic change   No active or microscopic colitis, dysplasia or malignancy identified         Note       Interpretation performed at [facility], [address] [place] [id number]        Additional Information       All reported additional testing was performed with appropriately reactive controls.  These tests were developed and their performance characteristics determined by [facility] or appropriate performing facility, though some tests may be performed on tissues which have not been validated for performance characteristics (such as staining performed on alcohol exposed cell blocks and decalcified tissues).  Results should be interpreted with caution and in the context of the patients’ clinical condition. These tests may not be cleared or approved by the U.S. Food and Drug Administration, though the FDA has determined that such clearance or approval is not necessary. These tests are used for clinical purposes and they should not be regarded as investigational or for research. This laboratory has been approved by CLIA 88, designated as a high-complexity laboratory and is qualified to perform these tests.  .      Synoptic Checklist          COLON/RECTUM POLYP FORM - GI - All Specimens          :    Other      Gross Description       A. The specimen is received in formalin, labeled with the patient's name and hospital number, and is designated \" transverse colon polyp\".  The specimen consists of multiple colorless soft tissue fragments measuring in aggregate of 0.6 x 0.4 x 0.1 cm.  Entirely submitted. One screened " cassette.    Note: The estimated total formalin fixation time based upon information provided by the submitting clinician and the standard processing schedule is under 72 hours.      Bindu      Clinical Information       One 3 mm sessile polyp in the transverse colon; performed cold snare with complete en bloc removal        Physical Exam  Constitutional:       General: He is not in acute distress.     Appearance: Normal appearance.   HENT:      Head: Normocephalic and atraumatic.      Nose: Nose normal.      Mouth/Throat:      Mouth: Mucous membranes are moist.   Eyes:      Extraocular Movements: Extraocular movements intact.      Pupils: Pupils are equal, round, and reactive to light.   Cardiovascular:      Rate and Rhythm: Normal rate and regular rhythm.      Pulses: Normal pulses.      Heart sounds: Normal heart sounds. No murmur heard.     No friction rub.   Pulmonary:      Effort: Pulmonary effort is normal. No respiratory distress.      Breath sounds: Normal breath sounds. No wheezing.   Abdominal:      General: Abdomen is flat. Bowel sounds are normal. There is no distension.      Palpations: Abdomen is soft. There is no mass.      Tenderness: There is no abdominal tenderness. There is no guarding.   Musculoskeletal:         General: Normal range of motion.      Cervical back: Neck supple.   Neurological:      General: No focal deficit present.      Mental Status: He is alert and oriented to person, place, and time. Mental status is at baseline.      Cranial Nerves: No cranial nerve deficit.   Psychiatric:         Mood and Affect: Mood normal.         Behavior: Behavior normal.

## 2025-06-30 ENCOUNTER — OFFICE VISIT (OUTPATIENT)
Dept: INTERNAL MEDICINE CLINIC | Facility: CLINIC | Age: 50
End: 2025-06-30
Payer: COMMERCIAL

## 2025-06-30 VITALS
TEMPERATURE: 99.3 F | DIASTOLIC BLOOD PRESSURE: 72 MMHG | BODY MASS INDEX: 27.11 KG/M2 | OXYGEN SATURATION: 99 % | HEIGHT: 69 IN | HEART RATE: 84 BPM | WEIGHT: 183 LBS | SYSTOLIC BLOOD PRESSURE: 134 MMHG

## 2025-06-30 DIAGNOSIS — L98.9 SKIN LESION: ICD-10-CM

## 2025-06-30 DIAGNOSIS — K90.89 OTHER SPECIFIED INTESTINAL MALABSORPTION: ICD-10-CM

## 2025-06-30 DIAGNOSIS — R35.1 BPH ASSOCIATED WITH NOCTURIA: ICD-10-CM

## 2025-06-30 DIAGNOSIS — E78.5 DYSLIPIDEMIA: ICD-10-CM

## 2025-06-30 DIAGNOSIS — Z00.00 ANNUAL PHYSICAL EXAM: Primary | ICD-10-CM

## 2025-06-30 DIAGNOSIS — N40.1 BPH ASSOCIATED WITH NOCTURIA: ICD-10-CM

## 2025-06-30 PROCEDURE — 99396 PREV VISIT EST AGE 40-64: CPT | Performed by: INTERNAL MEDICINE

## 2025-06-30 PROCEDURE — 93000 ELECTROCARDIOGRAM COMPLETE: CPT | Performed by: INTERNAL MEDICINE

## 2025-06-30 NOTE — PATIENT INSTRUCTIONS
"Patient Education     Routine physical for adults   The Basics   Written by the doctors and editors at [facility]   What is a physical? -- A physical is a routine visit, or \"check-up,\" with your doctor. You might also hear it called a \"wellness visit\" or \"preventive visit.\"  During each visit, the doctor will:   Ask about your physical and mental health   Ask about your habits, behaviors, and lifestyle   Do an exam   Give you vaccines if needed   Talk to you about any medicines you take   Give advice about your health   Answer your questions  Getting regular check-ups is an important part of taking care of your health. It can help your doctor find and treat any problems you have. But it's also important for preventing health problems.  A routine physical is different from a \"sick visit.\" A sick visit is when you see a doctor because of a health concern or problem. Since physicals are scheduled ahead of time, you can think about what you want to ask the doctor.  How often should I get a physical? -- It depends on your age and health. In general, for people age 21 years and older:   If you are younger than 50 years, you might be able to get a physical every 3 years.   If you are 50 years or older, your doctor might recommend a physical every year.  If you have an ongoing health condition, like diabetes or high blood pressure, your doctor will probably want to see you more often.  What happens during a physical? -- In general, each visit will include:   Physical exam - The doctor or nurse will check your height, weight, heart rate, and blood pressure. They will also look at your eyes and ears. They will ask about how you are feeling and whether you have any symptoms that bother you.   Medicines - It's a good idea to bring a list of all the medicines you take to each doctor visit. Your doctor will talk to you about your medicines and answer any questions. Tell them if you are having any side effects that bother you. You " "should also tell them if you are having trouble paying for any of your medicines.   Habits and behaviors - This includes:   Your diet   Your exercise habits   Whether you smoke, drink alcohol, or use drugs   Whether you are sexually active   Whether you feel safe at home  Your doctor will talk to you about things you can do to improve your health and lower your risk of health problems. They will also offer help and support. For example, if you want to quit smoking, they can give you advice and might prescribe medicines. If you want to improve your diet or get more physical activity, they can help you with this, too.   Lab tests, if needed - The tests you get will depend on your age and situation. For example, your doctor might want to check your:   Cholesterol   Blood sugar   Iron level   Vaccines - The recommended vaccines will depend on your age, health, and what vaccines you already had. Vaccines are very important because they can prevent certain serious or deadly infections.   Discussion of screening - \"Screening\" means checking for diseases or other health problems before they cause symptoms. Your doctor can recommend screening based on your age, risk, and preferences. This might include tests to check for:   Cancer, such as breast, prostate, cervical, ovarian, colorectal, prostate, lung, or skin cancer   Sexually transmitted infections, such as chlamydia and gonorrhea   Mental health conditions like depression and anxiety  Your doctor will talk to you about the different types of screening tests. They can help you decide which screenings to have. They can also explain what the results might mean.   Answering questions - The physical is a good time to ask the doctor or nurse questions about your health. If needed, they can refer you to other doctors or specialists, too.  Adults older than 65 years often need other care, too. As you get older, your doctor will talk to you about:   How to prevent falling at " home   Hearing or vision tests   Memory testing   How to take your medicines safely   Making sure that you have the help and support you need at home  All topics are updated as new evidence becomes available and our peer review process is complete.  This topic retrieved from [employer] on: May 02, 2024.  Topic 761895 Version 1.0  Release: 32.4.3 - C32.122  © 2024 UpToDate, Inc. and/or its affiliates. All rights reserved.  Consumer Information Use and Disclaimer   Disclaimer: This generalized information is a limited summary of diagnosis, treatment, and/or medication information. It is not meant to be comprehensive and should be used as a tool to help the user understand and/or assess potential diagnostic and treatment options. It does NOT include all information about conditions, treatments, medications, side effects, or risks that may apply to a specific patient. It is not intended to be medical advice or a substitute for the medical advice, diagnosis, or treatment of a health care provider based on the health care provider's examination and assessment of a patient's specific and unique circumstances. Patients must speak with a health care provider for complete information about their health, medical questions, and treatment options, including any risks or benefits regarding use of medications. This information does not endorse any treatments or medications as safe, effective, or approved for treating a specific patient. UpToDate, Inc. and its affiliates disclaim any warranty or liability relating to this information or the use thereof.The use of this information is governed by the Terms of Use, available at https://www.woltersQellouwer.com/en/know/clinical-effectiveness-terms. 2024© UpToDate, Inc. and its affiliates and/or licensors. All rights reserved.  Copyright   © 2024 UpToDate, Inc. and/or its affiliates. All rights reserved.

## 2025-06-30 NOTE — PROGRESS NOTES
Adult Annual Physical  Name: Kedar Bustillo      : 1975      MRN: [id number]  Encounter Provider: Dillan Love MD  Encounter Date: 2025   Encounter department: [facility]    :  Assessment & Plan  Annual physical exam    Orders:  •  POCT ECG  •  CBC and differential; Future  •  Comprehensive metabolic panel; Future  •  Lipid panel; Future  •  TSH, 3rd generation; Future  •  UA (URINE) with reflex to Scope; Future  •  Uric acid; Future  •  Urinalysis with microscopic; Future    Dyslipidemia    Orders:  •  CT coronary calcium score; Future    Other specified intestinal malabsorption    Orders:  •  Vitamin B12; Future  •  Vitamin D 25 hydroxy; Future    BPH associated with nocturia    Orders:  •  PSA Total, Diagnostic; Future  •  Ambulatory Referral to Urology; Future    Skin lesion    Orders:  •  Ambulatory Referral to Dermatology; Future        Preventive Screenings:    - Prostate cancer screening: screening up-to-date     Immunizations:  - Immunizations due: Tdap         History of Present Illness     Adult Annual Physical:  Patient presents for annual physical.     Diet and Physical Activity:  - Diet/Nutrition: low carb diet and intermittent fasting.  - Exercise: no formal exercise.    Depression Screening:  - PHQ-2 Score: 0    General Health:  - Sleep: sleeps poorly and 4-6 hours of sleep on average.  - Hearing: normal hearing bilateral ears.  - Vision: wears glasses and most recent eye exam > 1 year ago.  - Dental: regular dental visits, brushes teeth twice daily and floss regularly.     Health:  - History of STDs: no.   - Urinary symptoms: none.     Advanced Care Planning:  - Has an advanced directive?: no    - Has a durable medical POA?: no      Review of Systems   Constitutional:  Negative for appetite change, chills, fatigue and fever.   HENT:  Negative for sore throat and trouble swallowing.    Eyes:  Negative for redness.   Respiratory:  Negative for shortness  "of breath.    Cardiovascular:  Negative for chest pain and palpitations.   Gastrointestinal:  Negative for abdominal pain, constipation and diarrhea.   Genitourinary:  Negative for dysuria and hematuria.   Musculoskeletal:  Negative for back pain and neck pain.   Skin:  Negative for rash.   Neurological:  Negative for seizures, weakness and headaches.   Hematological:  Negative for adenopathy.   Psychiatric/Behavioral:  Negative for confusion. The patient is not nervous/anxious.          Objective   /72 (BP Location: Left arm, Patient Position: Sitting, Cuff Size: Standard)   Pulse 84   Temp 99.3 °F (37.4 °C) (Temporal)   Ht 5' 9\" (1.753 m)   Wt 83 kg (183 lb)   SpO2 99%   BMI 27.02 kg/m²     Physical Exam  Vitals and nursing note reviewed.   Constitutional:       General: He is not in acute distress.     Appearance: He is well-developed.   HENT:      Head: Normocephalic and atraumatic.     Eyes:      Conjunctiva/sclera: Conjunctivae normal.       Cardiovascular:      Rate and Rhythm: Normal rate and regular rhythm.      Heart sounds: No murmur heard.  Pulmonary:      Effort: Pulmonary effort is normal. No respiratory distress.      Breath sounds: Normal breath sounds.   Abdominal:      Palpations: Abdomen is soft.      Tenderness: There is no abdominal tenderness.     Musculoskeletal:         General: No swelling.      Cervical back: Neck supple.     Skin:     General: Skin is warm and dry.      Capillary Refill: Capillary refill takes less than 2 seconds.     Neurological:      Mental Status: He is alert.     Psychiatric:         Mood and Affect: Mood normal.       "

## 2025-07-03 ENCOUNTER — TELEPHONE (OUTPATIENT)
Age: 50
End: 2025-07-03

## 2025-07-03 NOTE — TELEPHONE ENCOUNTER
New Patient      Insurance:   Current Insurance? yes    Insurance E-verified? yes  History:   Reason for appointment/active symptoms?     BPH associated w/nocturia  Has the patient had any previous Urologist(s)?      Was the patient seen in the ED? no     Labs/Imaging(Including Out Of Network)? yes     Records Requested? no  Records Visible in EPIC? yes     Personal history of cancer? no     Appointment:   Office location preference:[facility]  ?   Appointment Details:   Date:  07/24/25  Time:  8:40  Location:  [facility]  Provider: Nicole Gabriel PA-C   Does the appointment need further review?

## 2025-07-24 LAB
25(OH)D3+25(OH)D2 SERPL-MCNC: 28 NG/ML (ref 30–100)
ALBUMIN SERPL-MCNC: 4.3 G/DL (ref 3.5–5.7)
ALP SERPL-CCNC: 38 U/L (ref 35–120)
ALT SERPL-CCNC: 32 U/L
ANION GAP SERPL CALCULATED.3IONS-SCNC: 7 MMOL/L (ref 3–11)
AST SERPL-CCNC: 20 U/L
BASOPHILS # BLD AUTO: 0.1 THOU/CMM (ref 0–0.1)
BASOPHILS NFR BLD AUTO: 1 %
BILIRUB SERPL-MCNC: 0.6 MG/DL (ref 0.2–1)
BUN SERPL-MCNC: 17 MG/DL (ref 7–28)
CALCIUM SERPL-MCNC: 9.2 MG/DL (ref 8.5–10.5)
CHLORIDE SERPL-SCNC: 106 MMOL/L (ref 100–109)
CHOLEST SERPL-MCNC: 161 MG/DL
CHOLEST/HDLC SERPL: 4.2 {RATIO}
CO2 SERPL-SCNC: 26 MMOL/L (ref 21–31)
CREAT SERPL-MCNC: 0.72 MG/DL (ref 0.53–1.3)
CYTOLOGY CMNT CVX/VAG CYTO-IMP: NORMAL
DIFFERENTIAL METHOD BLD: NORMAL
EOSINOPHIL # BLD AUTO: 0.2 THOU/CMM (ref 0–0.5)
EOSINOPHIL NFR BLD AUTO: 4 %
ERYTHROCYTE [DISTWIDTH] IN BLOOD BY AUTOMATED COUNT: 13 % (ref 12–16)
GFR/BSA.PRED SERPLBLD CYS-BASED-ARV: 113 ML/MIN/{1.73_M2}
GLUCOSE SERPL-MCNC: 92 MG/DL (ref 65–99)
GLUCOSE UR QL STRIP: NEGATIVE MG/DL
HCT VFR BLD AUTO: 43 % (ref 37–48)
HDLC SERPL-MCNC: 38 MG/DL (ref 23–92)
HGB BLD-MCNC: 14.8 G/DL (ref 12.5–17)
HGB UR QL STRIP: NEGATIVE MG/DL
KETONES UR QL STRIP: NEGATIVE MG/DL
LDLC SERPL CALC-MCNC: 112 MG/DL
LEUKOCYTE ESTERASE UR QL STRIP: NEGATIVE /UL
LYMPHOCYTES # BLD AUTO: 2 THOU/CMM (ref 1–3)
LYMPHOCYTES NFR BLD AUTO: 29 %
MCH RBC QN AUTO: 31.2 PG (ref 27–36)
MCHC RBC AUTO-ENTMCNC: 34.4 G/DL (ref 32–37)
MCV RBC AUTO: 91 FL (ref 80–100)
MONOCYTES # BLD AUTO: 0.6 THOU/CMM (ref 0.3–1)
MONOCYTES NFR BLD AUTO: 9 %
MUCOUS THREADS URNS QL MICRO: NORMAL
NEUTROPHILS # BLD AUTO: 3.9 THOU/CMM (ref 1.8–7.8)
NEUTROPHILS NFR BLD AUTO: 57 %
NITRITE UR QL STRIP: NEGATIVE
NONHDLC SERPL-MCNC: 123 MG/DL
PH UR: 6 [PH] (ref 4.5–8)
PLATELET # BLD AUTO: 229 THOU/CMM (ref 140–350)
PMV BLD REES-ECKER: 9.2 FL (ref 7.5–11.3)
POTASSIUM SERPL-SCNC: 4.2 MMOL/L (ref 3.5–5.2)
PROT 24H UR-MRATE: NEGATIVE MG/DL
PROT SERPL-MCNC: 6.9 G/DL (ref 6.3–8.3)
RBC # BLD AUTO: 4.74 MILL/CMM (ref 4–5.4)
RBC #/AREA URNS HPF: 0 /HPF (ref 0–2)
SL AMB POCT URINE COMMENT: NORMAL
SL AMB PSA, TOTAL: 0.9 NG/ML
SODIUM SERPL-SCNC: 139 MMOL/L (ref 135–145)
SP GR UR: 1.02 (ref 1–1.03)
TRIGL SERPL-MCNC: 54 MG/DL
TSH SERPL-ACNC: 2.53 UIU/ML (ref 0.45–5.33)
URATE SERPL-MCNC: 5.6 MG/DL (ref 3.5–7)
VIT B12 SERPL-MCNC: 419 PG/ML (ref 180–914)
WBC # BLD AUTO: 6.8 THOU/CMM (ref 4–10.5)
WBC #/AREA URNS HPF: 0 /HPF (ref 0–5)

## 2025-07-28 ENCOUNTER — HOSPITAL ENCOUNTER (OUTPATIENT)
Dept: CT IMAGING | Facility: HOSPITAL | Age: 50
Discharge: HOME | End: 2025-07-28
Attending: INTERNAL MEDICINE
Payer: COMMERCIAL

## 2025-07-28 DIAGNOSIS — E78.5 DYSLIPIDEMIA: ICD-10-CM

## 2025-07-28 PROCEDURE — 75571 CT HRT W/O DYE W/CA TEST: CPT

## 2025-08-04 ENCOUNTER — CONSULT (OUTPATIENT)
Dept: UROLOGY | Facility: MEDICAL CENTER | Age: 50
End: 2025-08-04
Payer: COMMERCIAL

## 2025-08-04 VITALS
DIASTOLIC BLOOD PRESSURE: 66 MMHG | SYSTOLIC BLOOD PRESSURE: 120 MMHG | RESPIRATION RATE: 20 BRPM | BODY MASS INDEX: 26.96 KG/M2 | OXYGEN SATURATION: 98 % | HEART RATE: 87 BPM | WEIGHT: 182 LBS | HEIGHT: 69 IN

## 2025-08-04 DIAGNOSIS — N40.1 BPH ASSOCIATED WITH NOCTURIA: Primary | ICD-10-CM

## 2025-08-04 DIAGNOSIS — R35.1 BPH ASSOCIATED WITH NOCTURIA: Primary | ICD-10-CM

## 2025-08-04 LAB
POST-VOID RESIDUAL VOLUME, ML POC: 106 ML
SL AMB  POCT GLUCOSE, UA: NORMAL
SL AMB LEUKOCYTE ESTERASE,UA: NORMAL
SL AMB POCT BILIRUBIN,UA: NORMAL
SL AMB POCT BLOOD,UA: NORMAL
SL AMB POCT CLARITY,UA: CLEAR
SL AMB POCT COLOR,UA: YELLOW
SL AMB POCT KETONES,UA: NORMAL
SL AMB POCT NITRITE,UA: NORMAL
SL AMB POCT PH,UA: 6.5
SL AMB POCT SPECIFIC GRAVITY,UA: 1.01
SL AMB POCT URINE PROTEIN: NORMAL
SL AMB POCT UROBILINOGEN: 0.2

## 2025-08-04 PROCEDURE — 51798 US URINE CAPACITY MEASURE: CPT | Performed by: UROLOGY

## 2025-08-04 PROCEDURE — 81003 URINALYSIS AUTO W/O SCOPE: CPT | Performed by: UROLOGY

## 2025-08-04 PROCEDURE — 99203 OFFICE O/P NEW LOW 30 MIN: CPT | Performed by: UROLOGY

## 2025-08-05 ENCOUNTER — RESULTS FOLLOW-UP (OUTPATIENT)
Dept: INTERNAL MEDICINE CLINIC | Facility: CLINIC | Age: 50
End: 2025-08-05